# Patient Record
Sex: FEMALE | Race: BLACK OR AFRICAN AMERICAN | NOT HISPANIC OR LATINO | Employment: STUDENT | URBAN - METROPOLITAN AREA
[De-identification: names, ages, dates, MRNs, and addresses within clinical notes are randomized per-mention and may not be internally consistent; named-entity substitution may affect disease eponyms.]

---
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
## 2023-02-15 ENCOUNTER — OFFICE VISIT (OUTPATIENT)
Dept: PRIMARY CARE CLINIC | Facility: CLINIC | Age: 18
End: 2023-02-15
Payer: MEDICAID

## 2023-02-15 VITALS
HEIGHT: 64 IN | OXYGEN SATURATION: 100 % | HEART RATE: 77 BPM | BODY MASS INDEX: 21.27 KG/M2 | SYSTOLIC BLOOD PRESSURE: 104 MMHG | WEIGHT: 124.56 LBS | DIASTOLIC BLOOD PRESSURE: 57 MMHG

## 2023-02-15 DIAGNOSIS — R10.9 FLANK PAIN: Primary | ICD-10-CM

## 2023-02-15 DIAGNOSIS — K21.9 GASTROESOPHAGEAL REFLUX DISEASE, UNSPECIFIED WHETHER ESOPHAGITIS PRESENT: ICD-10-CM

## 2023-02-15 PROCEDURE — 1159F MED LIST DOCD IN RCRD: CPT | Mod: CPTII,,, | Performed by: STUDENT IN AN ORGANIZED HEALTH CARE EDUCATION/TRAINING PROGRAM

## 2023-02-15 PROCEDURE — 3008F PR BODY MASS INDEX (BMI) DOCUMENTED: ICD-10-PCS | Mod: CPTII,,, | Performed by: STUDENT IN AN ORGANIZED HEALTH CARE EDUCATION/TRAINING PROGRAM

## 2023-02-15 PROCEDURE — 99213 OFFICE O/P EST LOW 20 MIN: CPT | Mod: PBBFAC,PN | Performed by: STUDENT IN AN ORGANIZED HEALTH CARE EDUCATION/TRAINING PROGRAM

## 2023-02-15 PROCEDURE — 99999 PR PBB SHADOW E&M-EST. PATIENT-LVL III: CPT | Mod: PBBFAC,,, | Performed by: STUDENT IN AN ORGANIZED HEALTH CARE EDUCATION/TRAINING PROGRAM

## 2023-02-15 PROCEDURE — 1159F PR MEDICATION LIST DOCUMENTED IN MEDICAL RECORD: ICD-10-PCS | Mod: CPTII,,, | Performed by: STUDENT IN AN ORGANIZED HEALTH CARE EDUCATION/TRAINING PROGRAM

## 2023-02-15 PROCEDURE — 3074F PR MOST RECENT SYSTOLIC BLOOD PRESSURE < 130 MM HG: ICD-10-PCS | Mod: CPTII,,, | Performed by: STUDENT IN AN ORGANIZED HEALTH CARE EDUCATION/TRAINING PROGRAM

## 2023-02-15 PROCEDURE — 3008F BODY MASS INDEX DOCD: CPT | Mod: CPTII,,, | Performed by: STUDENT IN AN ORGANIZED HEALTH CARE EDUCATION/TRAINING PROGRAM

## 2023-02-15 PROCEDURE — 99213 PR OFFICE/OUTPT VISIT, EST, LEVL III, 20-29 MIN: ICD-10-PCS | Mod: S$PBB,,, | Performed by: STUDENT IN AN ORGANIZED HEALTH CARE EDUCATION/TRAINING PROGRAM

## 2023-02-15 PROCEDURE — 3078F DIAST BP <80 MM HG: CPT | Mod: CPTII,,, | Performed by: STUDENT IN AN ORGANIZED HEALTH CARE EDUCATION/TRAINING PROGRAM

## 2023-02-15 PROCEDURE — 99213 OFFICE O/P EST LOW 20 MIN: CPT | Mod: S$PBB,,, | Performed by: STUDENT IN AN ORGANIZED HEALTH CARE EDUCATION/TRAINING PROGRAM

## 2023-02-15 PROCEDURE — 99999 PR PBB SHADOW E&M-EST. PATIENT-LVL III: ICD-10-PCS | Mod: PBBFAC,,, | Performed by: STUDENT IN AN ORGANIZED HEALTH CARE EDUCATION/TRAINING PROGRAM

## 2023-02-15 PROCEDURE — 3078F PR MOST RECENT DIASTOLIC BLOOD PRESSURE < 80 MM HG: ICD-10-PCS | Mod: CPTII,,, | Performed by: STUDENT IN AN ORGANIZED HEALTH CARE EDUCATION/TRAINING PROGRAM

## 2023-02-15 PROCEDURE — 3074F SYST BP LT 130 MM HG: CPT | Mod: CPTII,,, | Performed by: STUDENT IN AN ORGANIZED HEALTH CARE EDUCATION/TRAINING PROGRAM

## 2023-02-15 NOTE — PROGRESS NOTES
02/15/2023    Patrica Spann  [id number]    Chief Complaint   Patient presents with    Abdominal Pain    Establish Care       HPI  This patient is new to me and presents to est car and below concerns.    Abd pain  Sharp pinches when walking on her sides. Does not happen at any other time, but notices more if she were to drink something. Does not feel if she is sitting down. Has been present for the last 3 days. Hx of recurrent UTIs but denies any current symptoms; notes that she has had UTI's w/o standard symptoms before.     GERD  Depends on what she eats but will have trouble digesting food and it will come back up  Was taking pantoprazole for one week but didn't notice a difference.    Diet: fried food and fast food and vanilla hulk   Sleep: no concerns  Exercise: none      Negative 10 point ROS outside of HPI    Social History     Socioeconomic History    Marital status: Single   Tobacco Use    Smoking status: Never    Smokeless tobacco: Never   Substance and Sexual Activity    Alcohol use: No    Drug use: No    Sexual activity: Never   Social History Narrative    Lives with mom, sister. in 11th grade. Older brother.        Current Outpatient Medications:     ciprofloxacin HCl (CIPRO) 500 MG tablet, SMARTSI Tablet(s) By Mouth Every 12 Hours, Disp: , Rfl:     ibuprofen (ADVIL,MOTRIN) 600 MG tablet, Take 1 tablet (600 mg total) by mouth 3 (three) times daily as needed for Pain. (Patient not taking: Reported on 10/14/2022), Disp: 10 tablet, Rfl: 0    ondansetron (ZOFRAN-ODT) 4 MG TbDL, Take 1 tablet (4 mg total) by mouth every 6 (six) hours as needed (nausea/vomitting). (Patient not taking: Reported on 10/14/2022), Disp: 15 tablet, Rfl: 0    pantoprazole (PROTONIX) 20 MG tablet, Take 1 tablet (20 mg total) by mouth once daily. (Patient not taking: Reported on 10/14/2022), Disp: 30 tablet, Rfl: 11      Physical Exam  Vitals:    02/15/23 1015   BP: (!) 104/57   Pulse: 77     Gen: well appearing, NAD  Resp: non  labored breathing, no crackles, no wheezes, CTAB  CV: RRR no murmur, gallops, rubs, no LE edema  Abd: soft nontender BS present no organomegaly  Pain non reproducible    1. Flank pain  - POCT URINALYSIS: negative for signs of infection    2.GERD  Recommended diet change and avoiding food triggers      RTC as needed    Raisa Roach MD  Family Medicine

## 2023-05-29 ENCOUNTER — PATIENT OUTREACH (OUTPATIENT)
Dept: ADMINISTRATIVE | Facility: HOSPITAL | Age: 18
End: 2023-05-29
Payer: MEDICAID

## 2023-05-29 ENCOUNTER — PATIENT MESSAGE (OUTPATIENT)
Dept: ADMINISTRATIVE | Facility: HOSPITAL | Age: 18
End: 2023-05-29
Payer: MEDICAID

## 2023-06-28 ENCOUNTER — OFFICE VISIT (OUTPATIENT)
Dept: OBSTETRICS AND GYNECOLOGY | Facility: CLINIC | Age: 18
End: 2023-06-28
Attending: OBSTETRICS & GYNECOLOGY
Payer: MEDICAID

## 2023-06-28 VITALS
HEIGHT: 64 IN | BODY MASS INDEX: 21.46 KG/M2 | WEIGHT: 125.69 LBS | DIASTOLIC BLOOD PRESSURE: 60 MMHG | SYSTOLIC BLOOD PRESSURE: 116 MMHG

## 2023-06-28 DIAGNOSIS — N90.7 VULVAR CYST: Primary | ICD-10-CM

## 2023-06-28 PROCEDURE — 1159F PR MEDICATION LIST DOCUMENTED IN MEDICAL RECORD: ICD-10-PCS | Mod: CPTII,,, | Performed by: OBSTETRICS & GYNECOLOGY

## 2023-06-28 PROCEDURE — 99213 OFFICE O/P EST LOW 20 MIN: CPT | Mod: S$PBB,,, | Performed by: OBSTETRICS & GYNECOLOGY

## 2023-06-28 PROCEDURE — 99213 OFFICE O/P EST LOW 20 MIN: CPT | Mod: PBBFAC | Performed by: OBSTETRICS & GYNECOLOGY

## 2023-06-28 PROCEDURE — 1159F MED LIST DOCD IN RCRD: CPT | Mod: CPTII,,, | Performed by: OBSTETRICS & GYNECOLOGY

## 2023-06-28 PROCEDURE — 1160F PR REVIEW ALL MEDS BY PRESCRIBER/CLIN PHARMACIST DOCUMENTED: ICD-10-PCS | Mod: CPTII,,, | Performed by: OBSTETRICS & GYNECOLOGY

## 2023-06-28 PROCEDURE — 3074F SYST BP LT 130 MM HG: CPT | Mod: CPTII,,, | Performed by: OBSTETRICS & GYNECOLOGY

## 2023-06-28 PROCEDURE — 3008F PR BODY MASS INDEX (BMI) DOCUMENTED: ICD-10-PCS | Mod: CPTII,,, | Performed by: OBSTETRICS & GYNECOLOGY

## 2023-06-28 PROCEDURE — 99999 PR PBB SHADOW E&M-EST. PATIENT-LVL III: CPT | Mod: PBBFAC,,, | Performed by: OBSTETRICS & GYNECOLOGY

## 2023-06-28 PROCEDURE — 99999 PR PBB SHADOW E&M-EST. PATIENT-LVL III: ICD-10-PCS | Mod: PBBFAC,,, | Performed by: OBSTETRICS & GYNECOLOGY

## 2023-06-28 PROCEDURE — 99213 PR OFFICE/OUTPT VISIT, EST, LEVL III, 20-29 MIN: ICD-10-PCS | Mod: S$PBB,,, | Performed by: OBSTETRICS & GYNECOLOGY

## 2023-06-28 PROCEDURE — 3008F BODY MASS INDEX DOCD: CPT | Mod: CPTII,,, | Performed by: OBSTETRICS & GYNECOLOGY

## 2023-06-28 PROCEDURE — 3078F DIAST BP <80 MM HG: CPT | Mod: CPTII,,, | Performed by: OBSTETRICS & GYNECOLOGY

## 2023-06-28 PROCEDURE — 1160F RVW MEDS BY RX/DR IN RCRD: CPT | Mod: CPTII,,, | Performed by: OBSTETRICS & GYNECOLOGY

## 2023-06-28 PROCEDURE — 3078F PR MOST RECENT DIASTOLIC BLOOD PRESSURE < 80 MM HG: ICD-10-PCS | Mod: CPTII,,, | Performed by: OBSTETRICS & GYNECOLOGY

## 2023-06-28 PROCEDURE — 3074F PR MOST RECENT SYSTOLIC BLOOD PRESSURE < 130 MM HG: ICD-10-PCS | Mod: CPTII,,, | Performed by: OBSTETRICS & GYNECOLOGY

## 2023-06-28 RX ORDER — SULFAMETHOXAZOLE AND TRIMETHOPRIM 400; 80 MG/1; MG/1
1 TABLET ORAL 2 TIMES DAILY
Qty: 20 TABLET | Refills: 0 | Status: SHIPPED | OUTPATIENT
Start: 2023-06-28 | End: 2023-07-08

## 2023-06-29 ENCOUNTER — TELEPHONE (OUTPATIENT)
Dept: UROGYNECOLOGY | Facility: CLINIC | Age: 18
End: 2023-06-29
Payer: MEDICAID

## 2023-06-29 NOTE — TELEPHONE ENCOUNTER
----- Message from Jud Martinez RN sent at 6/29/2023 12:03 PM CDT -----  This is a referral to uro/gyn to be seen ASAP.    Jud  ----- Message -----  From: Lia Cantu MA  Sent: 6/29/2023  11:55 AM CDT  To: Jud Martinez RN    Call me [phone]  ----- Message -----  From: Jud Martinez RN  Sent: 6/29/2023  11:52 AM CDT  To: Lia Cantu MA, Rosa Maria Hook RN    Hi Lia,  Does she need to be seen by gyn/oncology or uro/gyn. We are happy to see her in gyn/oncology, but Dr. Talbot's note and referral is to uro/gyn.    Jud  ----- Message -----  From: Rosa Maria Hook RN  Sent: 6/28/2023   5:29 PM CDT  To: Jud Martinez RN      ----- Message -----  From: Lia Cantu MA  Sent: 6/28/2023   2:57 PM CDT  To: [facility] G[facility] Onc Clinical Support    Dr Talbot is referring patient for vulvar abscess to be seem in 48 hrs.    Lia  OB/GYN

## 2023-06-29 NOTE — PROGRESS NOTES
SUBJECTIVE:   17 y.o. female   for left vulvar cyst. Patient's last menstrual period was 2023..  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   Records show 2017-- initial abscess- failed oral ABX. went to OR for I&D (office procedure not tolerated). Not in location of Bartholin's.   Recurred 2020 and treated with oral Bactrim.    Recurrence 12-15-21 ED did I&D with Word cathter placement - removed 22  Recurred 10-14-22. Had spontaneous rupture with resolution  Now reports swelling and pain for 5 days to left vulva. Has been applying some topical ABX from [place] with relief. No drainage.         Past Medical History:   Diagnosis Date    Gastroesophageal reflux disease without esophagitis     Headache     Recurrent UTI      History reviewed. No pertinent surgical history.  Social History     Socioeconomic History    Marital status: Single   Tobacco Use    Smoking status: Never    Smokeless tobacco: Never   Substance and Sexual Activity    Alcohol use: No    Drug use: No    Sexual activity: Never   Social History Narrative    Lives with mom, sister. in 11th grade. Older brother.      Family History   Problem Relation Age of Onset    Hypertension Paternal Grandfather     Cancer Paternal Grandfather     Hypertension Paternal Grandmother     Cancer Paternal Grandmother     Cancer Maternal Grandmother     Cancer Maternal Grandfather     Hypertension Maternal Grandfather     Hypertension Father     Miscarriages / Stillbirths Neg Hx     Diabetes Neg Hx      OB History    Para Term  AB Living   0 0 0 0 0 0   SAB IAB Ectopic Multiple Live Births   0 0 0 0           Current Outpatient Medications   Medication Sig Dispense Refill    ibuprofen (ADVIL,MOTRIN) 600 MG tablet Take 1 tablet (600 mg total) by mouth 3 (three) times daily as needed for Pain. (Patient not taking: Reported on 10/14/2022) 10 tablet 0    ondansetron (ZOFRAN-ODT) 4 MG TbDL Take 1 tablet (4 mg total) by mouth every 6  "(six) hours as needed (nausea/vomitting). (Patient not taking: Reported on 10/14/2022) 15 tablet 0    pantoprazole (PROTONIX) 20 MG tablet Take 1 tablet (20 mg total) by mouth once daily. (Patient not taking: Reported on 10/14/2022) 30 tablet 11    sulfamethoxazole-trimethoprim 400-80mg (BACTRIM) 400-80 mg per tablet Take 1 tablet by mouth 2 (two) times daily. for 10 days 20 tablet 0     No current facility-administered medications for this visit.     Allergies: Amoxicillin-pot clavulanate     ROS:  Constitutional: no weight loss, weight gain, fever, fatigue  Eyes:  No vision changes, glasses/contacts  ENT/Mouth: No ulcers, sinus problems, ears ringing, headache  Cardiovascular: No inability to lie flat, chest pain, exercise intolerance, swelling, heart palpitations  Respiratory: No wheezing, coughing blood, shortness of breath, or cough  Gastrointestinal: No diarrhea, bloody stool, nausea/vomiting, constipation, gas, hemorrhoids  Genitourinary: No blood in urine, painful urination, urgency of urination, frequency of urination, incomplete emptying, incontinence, abnormal bleeding, painful periods, heavy periods, vaginal discharge, vaginal odor, painful intercourse, sexual problems, bleeding after intercourse.  Musculoskeletal: No muscle weakness  Skin/Breast: No painful breasts, nipple discharge, masses, rash, ulcers  Neurological: No passing out, seizures, numbness, headache  Endocrine: No diabetes, hypothyroid, hyperthyroid, hot flashes, hair loss, abnormal hair growth, ance  Psychiatric: No depression, crying  Hematologic: No bruises, bleeding, swollen lymph nodes, anemia.      OBJECTIVE:   The patient appears well, alert, oriented x 3, in no distress.  /60   Ht 5' 4" (1.626 m)   Wt 57 kg (125 lb 10.6 oz)   LMP 06/22/2023   BMI 21.57 kg/m²     GENITALIA: normal external genitalia, no erythema, no discharge  Mid portion of left labia majora with 3 cm swelling. Does not appear to be in location of " Bartholn's gland  Some fluctuance and tenderness. Presumed cyst vs early abscess  No drainage.      Physical Exam  Genitourinary:              ASSESSMENT:   1. Vulvar cyst  Ambulatory referral/consult to Urogynecology          PLAN:   Orders Placed This Encounter    Ambulatory referral/consult to Urogynecology    sulfamethoxazole-trimethoprim 400-80mg (BACTRIM) 400-80 mg per tablet     Discussed labia cyst/ abscess. Trial of oral ABX  Will consider surgical options- marsupialization vs excision. And OR vs office with local.  Would favor OR marsupialization as opposed to office to best reduce recurrence risks.  Cyst excision has risks of bleeding, scarring, pain.  Return to clinic 2-3 days  Consider urogyn consult

## 2023-06-29 NOTE — TELEPHONE ENCOUNTER
----- Message from Lia Cantu MA sent at 6/29/2023 10:38 AM CDT -----  Dr Talbot is referring patient for Vulvar abscess - to be seem by Friday    Lia  #[id number]

## 2023-06-29 NOTE — TELEPHONE ENCOUNTER
----- Message from Merlene Bajwa MA sent at 6/29/2023 11:57 AM CDT -----  Please contact pt for appt scheduling.      Thank you  ----- Message -----  From: Lia Cantu MA  Sent: 6/29/2023  10:42 AM CDT  To: [facility] Urogyn Clinical Support Staff    Dr Talbot is referring patient for Vulvar abscess - to be seem by Friday    Lia  #[id number]

## 2023-06-30 ENCOUNTER — OFFICE VISIT (OUTPATIENT)
Dept: UROGYNECOLOGY | Facility: CLINIC | Age: 18
End: 2023-06-30
Attending: OBSTETRICS & GYNECOLOGY
Payer: MEDICAID

## 2023-06-30 VITALS
DIASTOLIC BLOOD PRESSURE: 68 MMHG | SYSTOLIC BLOOD PRESSURE: 118 MMHG | BODY MASS INDEX: 21.46 KG/M2 | TEMPERATURE: 76 F | WEIGHT: 125 LBS

## 2023-06-30 DIAGNOSIS — N90.7 VULVAR CYST: Primary | ICD-10-CM

## 2023-06-30 DIAGNOSIS — N90.7 VULVAR CYST: ICD-10-CM

## 2023-06-30 DIAGNOSIS — Z01.818 PREOP TESTING: ICD-10-CM

## 2023-06-30 PROBLEM — Z98.890 STATUS POST INCISION AND DRAINAGE: Status: RESOLVED | Noted: 2017-04-13 | Resolved: 2023-06-30

## 2023-06-30 PROBLEM — N76.4 VULVAR ABSCESS: Status: RESOLVED | Noted: 2020-09-01 | Resolved: 2023-06-30

## 2023-06-30 PROBLEM — R10.31 ABDOMINAL PAIN, ACUTE, RIGHT LOWER QUADRANT: Status: RESOLVED | Noted: 2022-06-22 | Resolved: 2023-06-30

## 2023-06-30 PROCEDURE — 1160F PR REVIEW ALL MEDS BY PRESCRIBER/CLIN PHARMACIST DOCUMENTED: ICD-10-PCS | Mod: CPTII,,, | Performed by: OBSTETRICS & GYNECOLOGY

## 2023-06-30 PROCEDURE — 3008F BODY MASS INDEX DOCD: CPT | Mod: CPTII,,, | Performed by: OBSTETRICS & GYNECOLOGY

## 2023-06-30 PROCEDURE — 99999 PR PBB SHADOW E&M-EST. PATIENT-LVL III: CPT | Mod: PBBFAC,,, | Performed by: OBSTETRICS & GYNECOLOGY

## 2023-06-30 PROCEDURE — 3078F DIAST BP <80 MM HG: CPT | Mod: CPTII,,, | Performed by: OBSTETRICS & GYNECOLOGY

## 2023-06-30 PROCEDURE — 1160F RVW MEDS BY RX/DR IN RCRD: CPT | Mod: CPTII,,, | Performed by: OBSTETRICS & GYNECOLOGY

## 2023-06-30 PROCEDURE — 3074F SYST BP LT 130 MM HG: CPT | Mod: CPTII,,, | Performed by: OBSTETRICS & GYNECOLOGY

## 2023-06-30 PROCEDURE — 99215 OFFICE O/P EST HI 40 MIN: CPT | Mod: S$PBB,,, | Performed by: OBSTETRICS & GYNECOLOGY

## 2023-06-30 PROCEDURE — 99999 PR PBB SHADOW E&M-EST. PATIENT-LVL III: ICD-10-PCS | Mod: PBBFAC,,, | Performed by: OBSTETRICS & GYNECOLOGY

## 2023-06-30 PROCEDURE — 3078F PR MOST RECENT DIASTOLIC BLOOD PRESSURE < 80 MM HG: ICD-10-PCS | Mod: CPTII,,, | Performed by: OBSTETRICS & GYNECOLOGY

## 2023-06-30 PROCEDURE — 1159F MED LIST DOCD IN RCRD: CPT | Mod: CPTII,,, | Performed by: OBSTETRICS & GYNECOLOGY

## 2023-06-30 PROCEDURE — 99215 PR OFFICE/OUTPT VISIT, EST, LEVL V, 40-54 MIN: ICD-10-PCS | Mod: S$PBB,,, | Performed by: OBSTETRICS & GYNECOLOGY

## 2023-06-30 PROCEDURE — 99213 OFFICE O/P EST LOW 20 MIN: CPT | Mod: PBBFAC | Performed by: OBSTETRICS & GYNECOLOGY

## 2023-06-30 PROCEDURE — 3008F PR BODY MASS INDEX (BMI) DOCUMENTED: ICD-10-PCS | Mod: CPTII,,, | Performed by: OBSTETRICS & GYNECOLOGY

## 2023-06-30 PROCEDURE — 3074F PR MOST RECENT SYSTOLIC BLOOD PRESSURE < 130 MM HG: ICD-10-PCS | Mod: CPTII,,, | Performed by: OBSTETRICS & GYNECOLOGY

## 2023-06-30 PROCEDURE — 1159F PR MEDICATION LIST DOCUMENTED IN MEDICAL RECORD: ICD-10-PCS | Mod: CPTII,,, | Performed by: OBSTETRICS & GYNECOLOGY

## 2023-06-30 NOTE — PROGRESS NOTES
"Chief Complaint   Patient presents with    vaginal cyst        HPI: Patient is a 17 y.o. female  who presents today with a recurrent vulvar cyst/abscess for several years. Review of records indicates that she had an EUA and I&D of a Bartholin's cyst on 17. Patient was seen subsequently for a similar symptoms 20 and was treated with Bactrim. She was also seen 12/15/21 for I&D and word catheter placement in the ED. Seen 10/14/22 following recurrence and spontaneous rupture with resolution. At that visit Dr. Huerta discussed marsupialization. Patient seen on 23 by Dr. Talbot due to swelling and pain for 5 days of the left vulva. She was using some topical antibiotics without resolution. Patient was prescribed Bactrim but has not started the medication.     Patient reports that last since last Saturday she experience pain and swelling. She typically states that "it gets really big". She has been using a topical antibiotic  so it has not gotten as large. She believes that it commonly occurs after waxing.      Review of Systems   Constitutional:  Negative for activity change, appetite change, chills, fatigue, fever and unexpected weight change.   HENT:  Negative for nasal congestion, dental problem, hearing loss, mouth dryness and sore throat.    Eyes:  Negative for pain and eye dryness.   Respiratory:  Negative for cough, shortness of breath and wheezing.    Cardiovascular:  Negative for chest pain, palpitations and leg swelling.   Gastrointestinal:  Negative for abdominal distention, abdominal pain, blood in stool, constipation and rectal pain.   Endocrine: Negative for cold intolerance and heat intolerance.   Genitourinary:  Negative for dyspareunia, hot flashes and vaginal dryness.   Musculoskeletal:  Negative for arthralgias, back pain, gait problem, joint swelling, myalgias, neck pain and neck stiffness.   Integumentary:  Negative for rash.   Neurological:  Negative for dizziness, tremors, " seizures, speech difficulty, weakness, light-headedness, numbness and headaches.   Psychiatric/Behavioral:  Negative for confusion, dysphoric mood and sleep disturbance. The patient is not nervous/anxious.       Past Medical History:   Diagnosis Date    Gastroesophageal reflux disease without esophagitis     Headache     Recurrent UTI        Past Surgical History:   Procedure Laterality Date    NO PAST SURGERIES           Current Outpatient Medications:     ibuprofen (ADVIL,MOTRIN) 600 MG tablet, Take 1 tablet (600 mg total) by mouth 3 (three) times daily as needed for Pain. (Patient not taking: Reported on 10/14/2022), Disp: 10 tablet, Rfl: 0    ondansetron (ZOFRAN-ODT) 4 MG TbDL, Take 1 tablet (4 mg total) by mouth every 6 (six) hours as needed (nausea/vomitting). (Patient not taking: Reported on 10/14/2022), Disp: 15 tablet, Rfl: 0    pantoprazole (PROTONIX) 20 MG tablet, Take 1 tablet (20 mg total) by mouth once daily. (Patient not taking: Reported on 10/14/2022), Disp: 30 tablet, Rfl: 11    sulfamethoxazole-trimethoprim 400-80mg (BACTRIM) 400-80 mg per tablet, Take 1 tablet by mouth 2 (two) times daily. for 10 days (Patient not taking: Reported on 6/30/2023), Disp: 20 tablet, Rfl: 0    Review of patient's allergies indicates:   Allergen Reactions    Amoxicillin-pot clavulanate        Family History   Problem Relation Age of Onset    No Known Problems Mother     Hypertension Father     Cancer Maternal Grandmother     Cancer Maternal Grandfather     Hypertension Maternal Grandfather     Hypertension Paternal Grandmother     Cancer Paternal Grandmother     Hypertension Paternal Grandfather     Cancer Paternal Grandfather     Miscarriages / Stillbirths Neg Hx     Diabetes Neg Hx        Social History     Socioeconomic History    Marital status: Single   Occupational History    Occupation: student    Occupation:    Tobacco Use    Smoking status: Never    Smokeless tobacco: Never   Substance and Sexual  Activity    Alcohol use: No    Drug use: No    Sexual activity: Never   Social History Narrative    Lives with mom and sister. in 11th grade. Has 3 brothers and 3 sisters.         OB History          0    Para   0    Term   0       0    AB   0    Living   0         SAB   0    IAB   0    Ectopic   0    Multiple   0    Live Births                     Gyn History    Mammogram: n/a  Pap smear: n/a  LMP: Patient's last menstrual period was 2023.   Postmenopausal bleeding: n/a      INITIAL PHYSICAL EXAMINATION    Vitals:    23 1112   BP: 118/68   Temp: (!) 76 °F (24.4 °C)      General: Healthy in appearance, Well nourished, Affect Normal, NAD.  Neck: Supple, No masses, Trachea midline, Thyroid normal size,  non-tender, no masses or nodules.  Nodes: No clavicular/cervical adenopathy.  Skin: Normal temperature, No atypical lesions or rash.  Heart: Normal sounds, no murmurs  Lungs: Normal respiratory effort.  Gastrointestinal: Non tender, Non distended, No masses, guarding or  rebound, No hepatosplenomegally, No hernia.  Ext: No clubbing, cyanosis, edema or varicosities.  DTR's: 2+ bilaterally  Strength 5/5 bilateral upper and lower extremities    Genitourinary-  Vulva: normal without lesions, masses, atrophy or pain; left labia mass 4 x 2.5 cm, mobile, non-tender  Urethra: meatus central and normal in appearance, no prolapse/caruncle, no masses or discharge. Empty supine stress test was negative.  Bladder: deferred  Vagina: deferred  Cervix: deferred  Uterus:  deferred  Adnexa: no masses, non tender.  Rectal: deferred   Neuro: S2-4- pin prick and light touch intact, Anal wink present      ASSESSMENT & PLAN:    Vulvar cyst  -     Ambulatory referral/consult to Urogynecology       16 yo with a left labial cyst presented today for evaluation. Given that it has recurred numerous times sine 2017 advised excision of the cyst. Risks and benefits were reviewed including, pain, infection, bleeding,  hematoma, scar or labial disfigurement, recurrence. Advised patient to stop waxing since this could be a source. Recommended as we are about to start a long weekend that she start the antibiotics. She will return in the afternoon on 7/6 to ensure that it has not resolved and to sign a consent. She is scheduled for excision at 7 am on 7/723.       All questions were answered today. The patient was encouraged to contact the office as needed with any additional questions or concerns.     Total time spent on visit was 40 minutes.  This includes face to face time and non-face to face time preparing to see the patient (eg, review of tests), Obtaining and/or reviewing separately obtained history, Documenting clinical information in the electronic or other health record, Independently interpreting resultsand communicating results to the patient/family/caregiver, or Care coordination.    Sienna Tinsley MD

## 2023-07-06 ENCOUNTER — HOSPITAL ENCOUNTER (OUTPATIENT)
Dept: PREADMISSION TESTING | Facility: OTHER | Age: 18
Discharge: HOME | End: 2023-07-06
Payer: MEDICAID

## 2023-07-06 RX ORDER — ACETAMINOPHEN 500 MG
1000 TABLET ORAL
Status: CANCELLED | OUTPATIENT
Start: 2023-07-06 | End: 2023-07-06

## 2023-07-06 RX ORDER — PREGABALIN 75 MG/1
75 CAPSULE ORAL ONCE
Status: CANCELLED | OUTPATIENT
Start: 2023-07-06 | End: 2023-07-06

## 2023-07-06 RX ORDER — SODIUM CHLORIDE, SODIUM LACTATE, POTASSIUM CHLORIDE, CALCIUM CHLORIDE 600; 310; 30; 20 MG/100ML; MG/100ML; MG/100ML; MG/100ML
INJECTION, SOLUTION INTRAVENOUS CONTINUOUS
Status: CANCELLED | OUTPATIENT
Start: 2023-07-06

## 2023-07-06 RX ORDER — LIDOCAINE HYDROCHLORIDE 10 MG/ML
0.5 INJECTION, SOLUTION EPIDURAL; INFILTRATION; INTRACAUDAL; PERINEURAL ONCE
Status: CANCELLED | OUTPATIENT
Start: 2023-07-06 | End: 2023-07-06

## 2023-07-06 NOTE — PRE ADMISSION SCREENING
Patient states bump went away and is no longer visible at all on the skin; given MD office number since wants to come in earlier.

## 2023-07-07 ENCOUNTER — TELEPHONE (OUTPATIENT)
Dept: UROGYNECOLOGY | Facility: CLINIC | Age: 18
End: 2023-07-07
Payer: MEDICAID

## 2023-07-07 NOTE — TELEPHONE ENCOUNTER
----- Message from Ami Ariza sent at 7/6/2023  8:57 AM CDT -----  Regarding: cyst gone  Name of Who is Calling:MAYELA TA [[id number]]          What is the request in detail: Pt has a preop appt today a procedure for a cyst removal tomorrow. The pt says that the cyst is gone. She is asking if you want to see her before she cancels procedure. Please c/b to assist           Can the clinic reply by [facility]:          What Number to Call Back if not in [facility]:[phone]

## 2023-07-16 ENCOUNTER — PATIENT MESSAGE (OUTPATIENT)
Dept: UROGYNECOLOGY | Facility: CLINIC | Age: 18
End: 2023-07-16
Payer: MEDICAID

## 2023-07-17 PROCEDURE — 56420 I&D BARTHOLINS GLAND ABSCESS: CPT

## 2023-07-17 PROCEDURE — 99284 EMERGENCY DEPT VISIT MOD MDM: CPT | Mod: 25

## 2023-07-18 ENCOUNTER — TELEPHONE (OUTPATIENT)
Dept: UROGYNECOLOGY | Facility: CLINIC | Age: 18
End: 2023-07-18
Payer: MEDICAID

## 2023-07-18 ENCOUNTER — HOSPITAL ENCOUNTER (EMERGENCY)
Facility: OTHER | Age: 18
Discharge: HOME | End: 2023-07-18
Attending: EMERGENCY MEDICINE
Payer: MEDICAID

## 2023-07-18 VITALS
HEART RATE: 92 BPM | DIASTOLIC BLOOD PRESSURE: 59 MMHG | BODY MASS INDEX: 21.63 KG/M2 | WEIGHT: 126 LBS | OXYGEN SATURATION: 98 % | SYSTOLIC BLOOD PRESSURE: 108 MMHG | RESPIRATION RATE: 18 BRPM | TEMPERATURE: 98 F

## 2023-07-18 DIAGNOSIS — N75.1 ABSCESS OF LEFT BARTHOLIN'S GLAND: Primary | ICD-10-CM

## 2023-07-18 DIAGNOSIS — R10.31 ABDOMINAL PAIN, ACUTE, RIGHT LOWER QUADRANT: ICD-10-CM

## 2023-07-18 DIAGNOSIS — R10.2 VULVAR PAIN: Primary | ICD-10-CM

## 2023-07-18 LAB
B-HCG UR QL: NEGATIVE
CTP QC/QA: YES

## 2023-07-18 PROCEDURE — 63600175 PHARM REV CODE 636 W HCPCS: Performed by: EMERGENCY MEDICINE

## 2023-07-18 PROCEDURE — 96372 THER/PROPH/DIAG INJ SC/IM: CPT | Performed by: EMERGENCY MEDICINE

## 2023-07-18 PROCEDURE — 99284 EMERGENCY DEPT VISIT MOD MDM: CPT | Mod: 25

## 2023-07-18 PROCEDURE — 56420 I&D BARTHOLINS GLAND ABSCESS: CPT

## 2023-07-18 PROCEDURE — 81025 URINE PREGNANCY TEST: CPT | Performed by: EMERGENCY MEDICINE

## 2023-07-18 PROCEDURE — 25000003 PHARM REV CODE 250: Performed by: EMERGENCY MEDICINE

## 2023-07-18 RX ORDER — LIDOCAINE HYDROCHLORIDE AND EPINEPHRINE 20; 10 MG/ML; UG/ML
10 INJECTION, SOLUTION INFILTRATION; PERINEURAL
Status: COMPLETED | OUTPATIENT
Start: 2023-07-18 | End: 2023-07-18

## 2023-07-18 RX ORDER — IBUPROFEN 600 MG/1
600 TABLET ORAL 3 TIMES DAILY PRN
Qty: 30 TABLET | Refills: 0 | Status: SHIPPED | OUTPATIENT
Start: 2023-07-18 | End: 2023-07-28

## 2023-07-18 RX ORDER — HYDROCODONE BITARTRATE AND ACETAMINOPHEN 5; 325 MG/1; MG/1
1 TABLET ORAL
Status: COMPLETED | OUTPATIENT
Start: 2023-07-18 | End: 2023-07-18

## 2023-07-18 RX ORDER — KETOROLAC TROMETHAMINE 30 MG/ML
30 INJECTION, SOLUTION INTRAMUSCULAR; INTRAVENOUS
Status: COMPLETED | OUTPATIENT
Start: 2023-07-18 | End: 2023-07-18

## 2023-07-18 RX ADMIN — HYDROCODONE BITARTRATE AND ACETAMINOPHEN 1 TABLET: 5; 325 TABLET ORAL at 03:07

## 2023-07-18 RX ADMIN — LIDOCAINE HYDROCHLORIDE,EPINEPHRINE BITARTRATE 10 ML: 20; .01 INJECTION, SOLUTION INFILTRATION; PERINEURAL at 03:07

## 2023-07-18 RX ADMIN — KETOROLAC TROMETHAMINE 30 MG: 30 INJECTION, SOLUTION INTRAMUSCULAR; INTRAVENOUS at 03:07

## 2023-07-18 NOTE — ED PROVIDER NOTES
Encounter Date: 7/17/2023    SCRIBE #1 NOTE: I, Yeison Navarro, am scribing for, and in the presence of,  Noni Wooten MD. I have scribed the following portions of the note - Other sections scribed: HPI, ROS, PE.     History     Chief Complaint   Patient presents with    Cyst     Pt possibly has a cyst located near her vagina opening and has been hurting over the past week.     Time seen by provider: 4:55 AM    This is a 17 y.o. female who presents with complaint of a cyst on her vulva that appeared about 2 weeks ago. The cyst is not draining. She reports associated headaches and chills. She reports being on bactrum to treat this previously, but stopped taking it for a time and the cyst reappeared since then. This is not the first time she has had the cyst, and it always appears on there same spot. She has an appointment tomorrow at her PCP to take a look at the area. Patient denies any other complaints at this time.    The history is provided by the patient.   Review of patient's allergies indicates:   Allergen Reactions    Amoxicillin-pot clavulanate      Past Medical History:   Diagnosis Date    Gastroesophageal reflux disease without esophagitis     Headache     Recurrent UTI      Past Surgical History:   Procedure Laterality Date    NO PAST SURGERIES       Family History   Problem Relation Age of Onset    No Known Problems Mother     Hypertension Father     Cancer Maternal Grandmother     Cancer Maternal Grandfather     Hypertension Maternal Grandfather     Hypertension Paternal Grandmother     Cancer Paternal Grandmother     Hypertension Paternal Grandfather     Cancer Paternal Grandfather     Miscarriages / Stillbirths Neg Hx     Diabetes Neg Hx      Social History     Tobacco Use    Smoking status: Never    Smokeless tobacco: Never   Substance Use Topics    Alcohol use: No    Drug use: No     Review of Systems   Constitutional:  Positive for chills. Negative for unexpected weight change.    HENT:  Negative for nosebleeds.    Eyes:  Negative for pain.   Respiratory:  Negative for apnea.    Cardiovascular:  Negative for palpitations.   Gastrointestinal:  Negative for constipation.   Genitourinary:  Negative for enuresis.   Skin:  Positive for rash. Negative for pallor.   Neurological:  Positive for headaches.   Hematological:  Does not bruise/bleed easily.   Psychiatric/Behavioral:  Negative for sleep disturbance.    All other systems reviewed and are negative.    Physical Exam     Initial Vitals [07/17/23 2329]   BP Pulse Resp Temp SpO2   126/72 90 17 98.9 °F (37.2 °C) 99 %      MAP       --         Physical Exam    Constitutional: She appears well-developed and well-nourished. She does not have a sickly appearance. No distress.   HENT:   Head: Normocephalic and atraumatic.   Right Ear: External ear normal.   Left Ear: External ear normal.   Eyes: Conjunctivae, EOM and lids are normal. Right eye exhibits no discharge. Left eye exhibits no discharge. Right conjunctiva is not injected. Right conjunctiva has no hemorrhage. Left conjunctiva is not injected. Left conjunctiva has no hemorrhage. No scleral icterus.   Neck: Phonation normal. No stridor present. No tracheal deviation present.   Normal range of motion.  Cardiovascular:            Pulses:       Radial pulses are 2+ on the right side and 2+ on the left side.        Dorsalis pedis pulses are 2+ on the right side and 2+ on the left side.   Pulmonary/Chest: No respiratory distress.   Genitourinary:    Genitourinary Comments: 2.5-3cm area of fluctuance on the left vulva     Musculoskeletal:      Cervical back: Normal range of motion.     Neurological: She is alert and oriented to person, place, and time. She has normal strength. GCS eye subscore is 4. GCS verbal subscore is 5. GCS motor subscore is 6.   Skin: Skin is warm.   Psychiatric: She has a normal mood and affect. Her speech is normal and behavior is normal. Judgment and thought content  normal. Cognition and memory are normal.       ED Course   I & D - Incision and Drainage    Date/Time: 7/18/2023 4:53 AM  Location procedure was performed: [facility] EMERGENCY DEPARTMENT  Performed by: Noni Wooten MD  Authorized by: Noni Wooten MD     Anesthesia:  Local Anesthetic: lidocaine 2% without epinephrine  Drainage: purulent  Comments: 10mL 2% lidocaine without epinephrine used. 11 Blade used,. Copious purulent drainage        Labs Reviewed   POCT URINE PREGNANCY          Imaging Results    None          Medications   LIDOcaine 2%/EPINEPHrine 1:100,000 injection 10 mL (10 mLs Intradermal Given 7/18/23 0331)   HYDROcodone-acetaminophen 5-325 mg per tablet 1 tablet (1 tablet Oral Given 7/18/23 0331)   ketorolac injection 30 mg (30 mg Intramuscular Given 7/18/23 0331)     Medical Decision Making:   History:   Old Medical Records: I decided to obtain old medical records.  Clinical Tests:   Lab Tests: Ordered and Reviewed  Additional MDM:   Comments: 17-year-old female with history of recurrent Bartholin abscesses presents with pain and swelling consistent with her previous abscesses.  She is well-appearing afebrile hemodynamically stable with a large abscess noted on exam.  Area was I and D with copious amount of purulent drainage expressed.  Word catheter placed and patient instructed to continue taking Bactrim that she was previously prescribed and to follow-up with her gyn this morning as already scheduled for further evaluation..      Scribe Attestation:   Scribe #1: I performed the above scribed service and the documentation accurately describes the services I performed. I attest to the accuracy of the note.       Physician Attestation for Scribe: I, Noni Wooten , reviewed documentation as scribed in my presence, which is both accurate and complete.             Clinical Impression:   Final diagnoses:  [N75.1] Abscess of left Bartholin's gland (Primary)        ED Disposition Condition    Discharge Stable           ED Prescriptions    None       Follow-up Information       Follow up With Specialties Details Why Contact Info    Sienna Tinsley MD Obstetrics and Gynecology, UroGynecology  Go today As scheduled [address] [address]  [phone]               Noni Wooten MD  07/18/23 6394

## 2023-07-18 NOTE — TELEPHONE ENCOUNTER
Spoke with pt who states she went to the ED and the cyst was drained and catheter was placed. Pt has been instructed to contact the office this week if catheter falls out so that the doctor can see the area, if catheter does not fall out patient has been scheduled to come in on Monday for removal, pt voiced understanding and call was ended.

## 2023-07-18 NOTE — TELEPHONE ENCOUNTER
----- Message from Primitivo Stern sent at 7/18/2023 12:38 PM CDT -----  Name of Who is Calling:MAYELA TA [[id number]]           What is the request in detail:pt stated that she would like to speak with the office directly in regards to her questions/concerns about her leaving the ER and wanted to know about the appt .Please contact to further discuss and advise.            Can the clinic reply by [facility]:[phone]           What Number to Call Back if not in [facility]:[phone]

## 2023-07-24 ENCOUNTER — OFFICE VISIT (OUTPATIENT)
Dept: UROGYNECOLOGY | Facility: CLINIC | Age: 18
End: 2023-07-24
Payer: MEDICAID

## 2023-07-24 VITALS
SYSTOLIC BLOOD PRESSURE: 113 MMHG | BODY MASS INDEX: 21.72 KG/M2 | WEIGHT: 126.56 LBS | DIASTOLIC BLOOD PRESSURE: 67 MMHG

## 2023-07-24 DIAGNOSIS — Z30.011 ENCOUNTER FOR INITIAL PRESCRIPTION OF CONTRACEPTIVE PILLS: ICD-10-CM

## 2023-07-24 DIAGNOSIS — N90.7 VULVAR CYST: Primary | ICD-10-CM

## 2023-07-24 PROCEDURE — 1159F MED LIST DOCD IN RCRD: CPT | Mod: CPTII,,, | Performed by: NURSE PRACTITIONER

## 2023-07-24 PROCEDURE — 99213 PR OFFICE/OUTPT VISIT, EST, LEVL III, 20-29 MIN: ICD-10-PCS | Mod: S$PBB,,, | Performed by: NURSE PRACTITIONER

## 2023-07-24 PROCEDURE — 3078F DIAST BP <80 MM HG: CPT | Mod: CPTII,,, | Performed by: NURSE PRACTITIONER

## 2023-07-24 PROCEDURE — 3008F BODY MASS INDEX DOCD: CPT | Mod: CPTII,,, | Performed by: NURSE PRACTITIONER

## 2023-07-24 PROCEDURE — 3078F PR MOST RECENT DIASTOLIC BLOOD PRESSURE < 80 MM HG: ICD-10-PCS | Mod: CPTII,,, | Performed by: NURSE PRACTITIONER

## 2023-07-24 PROCEDURE — 99213 OFFICE O/P EST LOW 20 MIN: CPT | Mod: S$PBB,,, | Performed by: NURSE PRACTITIONER

## 2023-07-24 PROCEDURE — 3074F SYST BP LT 130 MM HG: CPT | Mod: CPTII,,, | Performed by: NURSE PRACTITIONER

## 2023-07-24 PROCEDURE — 99999 PR PBB SHADOW E&M-EST. PATIENT-LVL III: ICD-10-PCS | Mod: PBBFAC,,, | Performed by: NURSE PRACTITIONER

## 2023-07-24 PROCEDURE — 1160F PR REVIEW ALL MEDS BY PRESCRIBER/CLIN PHARMACIST DOCUMENTED: ICD-10-PCS | Mod: CPTII,,, | Performed by: NURSE PRACTITIONER

## 2023-07-24 PROCEDURE — 3074F PR MOST RECENT SYSTOLIC BLOOD PRESSURE < 130 MM HG: ICD-10-PCS | Mod: CPTII,,, | Performed by: NURSE PRACTITIONER

## 2023-07-24 PROCEDURE — 99999 PR PBB SHADOW E&M-EST. PATIENT-LVL III: CPT | Mod: PBBFAC,,, | Performed by: NURSE PRACTITIONER

## 2023-07-24 PROCEDURE — 3008F PR BODY MASS INDEX (BMI) DOCUMENTED: ICD-10-PCS | Mod: CPTII,,, | Performed by: NURSE PRACTITIONER

## 2023-07-24 PROCEDURE — 1160F RVW MEDS BY RX/DR IN RCRD: CPT | Mod: CPTII,,, | Performed by: NURSE PRACTITIONER

## 2023-07-24 PROCEDURE — 1159F PR MEDICATION LIST DOCUMENTED IN MEDICAL RECORD: ICD-10-PCS | Mod: CPTII,,, | Performed by: NURSE PRACTITIONER

## 2023-07-24 PROCEDURE — 99213 OFFICE O/P EST LOW 20 MIN: CPT | Mod: PBBFAC | Performed by: NURSE PRACTITIONER

## 2023-07-24 RX ORDER — NORETHINDRONE ACETATE AND ETHINYL ESTRADIOL .02; 1 MG/1; MG/1
1 TABLET ORAL DAILY
Qty: 28 TABLET | Refills: 11 | Status: SHIPPED | OUTPATIENT
Start: 2023-07-24 | End: 2024-07-23

## 2023-07-24 NOTE — PATIENT INSTRUCTIONS
Vulvar cyst-- word catheter fell out 2 days later.     2. Desires contraception  --start loestrin Sunday after your period starts  --you need a backup method for the first week of the pills    3. Call / email when you feel like the cyst is filling up again and we will get you in

## 2023-07-24 NOTE — PROGRESS NOTES
"  Urogyn follow up  2023  .  [facility] - UROGYNECOLOGY  [address] [address]    Patrica Spann  [id number]  2005      Patrica Spann is a 17 y.o. here for a urogyn follow up for L vulvar cyst.    Last HPI from 2023  : Patient is a 17 y.o. female  who presents today with a recurrent vulvar cyst/abscess for several years. Review of records indicates that she had an EUA and I&D of a Bartholin's cyst on 17. Patient was seen subsequently for a similar symptoms 20 and was treated with Bactrim. She was also seen 12/15/21 for I&D and word catheter placement in the ED. Seen 10/14/22 following recurrence and spontaneous rupture with resolution. At that visit Dr. Huerta discussed marsupialization. Patient seen on 23 by Dr. Talbot due to swelling and pain for 5 days of the left vulva. She was using some topical antibiotics without resolution. Patient was prescribed Bactrim but has not started the medication.      Patient reports that last since last Saturday she experience pain and swelling. She typically states that "it gets really big". She has been using a topical antibiotic  so it has not gotten as large. She believes that it commonly occurs after waxing.       Changes from last visit:  Went to er on 2023 for cyst exacerbation-- lanced in ER and word catheter place. Patient states word catheter came out 2 days later. Pain has resolve-- no long having any discharge.    Past Medical History:   Diagnosis Date    Gastroesophageal reflux disease without esophagitis     Headache     Recurrent UTI        Past Surgical History:   Procedure Laterality Date    NO PAST SURGERIES         Family History   Problem Relation Age of Onset    No Known Problems Mother     Hypertension Father     Cancer Maternal Grandmother     Cancer Maternal Grandfather     Hypertension Maternal Grandfather     Hypertension Paternal Grandmother     Cancer Paternal Grandmother  "    Hypertension Paternal Grandfather     Cancer Paternal Grandfather     Miscarriages / Stillbirths Neg Hx     Diabetes Neg Hx        Social History     Socioeconomic History    Marital status: Single   Occupational History    Occupation: student    Occupation:    Tobacco Use    Smoking status: Never    Smokeless tobacco: Never   Substance and Sexual Activity    Alcohol use: No    Drug use: No    Sexual activity: Never   Social History Narrative    Lives with mom and sister. in 11th grade. Has 3 brothers and 3 sisters.         Current Outpatient Medications   Medication Sig Dispense Refill    ibuprofen (ADVIL,MOTRIN) 600 MG tablet Take 1 tablet (600 mg total) by mouth 3 (three) times daily as needed for Pain. (Patient not taking: Reported on 7/24/2023) 30 tablet 0    norethindrone-ethinyl estradiol (MICROGESTIN 1/20) 1-20 mg-mcg per tablet Take 1 tablet by mouth once daily. 28 tablet 11    pantoprazole (PROTONIX) 20 MG tablet Take 1 tablet (20 mg total) by mouth once daily. (Patient not taking: Reported on 10/14/2022) 30 tablet 11     No current facility-administered medications for this visit.       Review of patient's allergies indicates:   Allergen Reactions    Amoxicillin-pot clavulanate        Well woman:  Pap:n/a      ROS:  As per HPI.      Exam  /67 (BP Location: Right arm, Patient Position: Sitting, BP Method: Medium (Automatic))   Wt 57.4 kg (126 lb 8.7 oz)   LMP 07/12/2023   BMI 21.72 kg/m²   General: alert and oriented, no acute distress  Respiratory: normal respiratory effort  Abd: soft, non-tender, non-distended    Pelvic  Ext. Genitalia: L vulva slightly swollen.  Cyst 2 cm x 1 cm slightly palpable in mid L labia-- not well defined. No tenderness with palpation. Small opening from lancing healing well.  Normal bartholin's and skeens glands  Rest of pelvic deferred    1. Vulvar cyst        2. Encounter for initial prescription of contraceptive pills  norethindrone-ethinyl estradiol  (MICROGESTIN 1/20) 1-20 mg-mcg per tablet        We reviewed the above issues and discussed options for short-term versus long-term management of her problems.   Plan:   Vulvar cyst-- word catheter fell out 2 days later.     2. Desires contraception  --start loestrin Sunday after your period starts  --you need a backup method for the first week of the pills    3. Call / email when you feel like the cyst is filling up again and we will get you in    I spent a total of 20 minutes on the day of the visit.  This includes face to face time and non-face to face time preparing to see the patient (eg, review of tests), obtaining and/or reviewing separately obtained history, documenting clinical information in the electronic or other health record, independently interpreting results and communicating results to the patient/family/caregiver, or care coordinator.     Johana Bergeron, BERTHA-BC [facility]  Division of Female Pelvic Medicine and Reconstructive Surgery  Department of Obstetrics & Gynecology

## 2023-08-01 DIAGNOSIS — R10.2 VULVAR PAIN: ICD-10-CM

## 2023-08-01 DIAGNOSIS — Z01.818 PREOP TESTING: ICD-10-CM

## 2023-08-01 DIAGNOSIS — N90.7 VULVAR CYST: Primary | ICD-10-CM

## 2023-08-01 NOTE — TELEPHONE ENCOUNTER
Offered patient appt for today at 1:40 or 2:40- patient declined and agreed to appt tomorrow for 8:40.

## 2023-08-02 ENCOUNTER — PATIENT MESSAGE (OUTPATIENT)
Dept: UROLOGY | Facility: CLINIC | Age: 18
End: 2023-08-02
Payer: MEDICAID

## 2023-08-02 ENCOUNTER — OFFICE VISIT (OUTPATIENT)
Dept: UROGYNECOLOGY | Facility: CLINIC | Age: 18
End: 2023-08-02
Payer: MEDICAID

## 2023-08-02 ENCOUNTER — ANESTHESIA EVENT (OUTPATIENT)
Dept: SURGERY | Facility: OTHER | Age: 18
End: 2023-08-02
Payer: MEDICAID

## 2023-08-02 VITALS
WEIGHT: 127 LBS | SYSTOLIC BLOOD PRESSURE: 99 MMHG | DIASTOLIC BLOOD PRESSURE: 66 MMHG | HEART RATE: 80 BPM | BODY MASS INDEX: 21.8 KG/M2

## 2023-08-02 DIAGNOSIS — N90.7 VULVAR CYST: Primary | ICD-10-CM

## 2023-08-02 PROCEDURE — 1160F RVW MEDS BY RX/DR IN RCRD: CPT | Mod: CPTII,,, | Performed by: OBSTETRICS & GYNECOLOGY

## 2023-08-02 PROCEDURE — 99213 OFFICE O/P EST LOW 20 MIN: CPT | Mod: S$PBB,,, | Performed by: OBSTETRICS & GYNECOLOGY

## 2023-08-02 PROCEDURE — 3008F PR BODY MASS INDEX (BMI) DOCUMENTED: ICD-10-PCS | Mod: CPTII,,, | Performed by: OBSTETRICS & GYNECOLOGY

## 2023-08-02 PROCEDURE — 99213 OFFICE O/P EST LOW 20 MIN: CPT | Mod: PBBFAC | Performed by: OBSTETRICS & GYNECOLOGY

## 2023-08-02 PROCEDURE — 3008F BODY MASS INDEX DOCD: CPT | Mod: CPTII,,, | Performed by: OBSTETRICS & GYNECOLOGY

## 2023-08-02 PROCEDURE — 3074F PR MOST RECENT SYSTOLIC BLOOD PRESSURE < 130 MM HG: ICD-10-PCS | Mod: CPTII,,, | Performed by: OBSTETRICS & GYNECOLOGY

## 2023-08-02 PROCEDURE — 1159F MED LIST DOCD IN RCRD: CPT | Mod: CPTII,,, | Performed by: OBSTETRICS & GYNECOLOGY

## 2023-08-02 PROCEDURE — 3078F DIAST BP <80 MM HG: CPT | Mod: CPTII,,, | Performed by: OBSTETRICS & GYNECOLOGY

## 2023-08-02 PROCEDURE — 1160F PR REVIEW ALL MEDS BY PRESCRIBER/CLIN PHARMACIST DOCUMENTED: ICD-10-PCS | Mod: CPTII,,, | Performed by: OBSTETRICS & GYNECOLOGY

## 2023-08-02 PROCEDURE — 99213 PR OFFICE/OUTPT VISIT, EST, LEVL III, 20-29 MIN: ICD-10-PCS | Mod: S$PBB,,, | Performed by: OBSTETRICS & GYNECOLOGY

## 2023-08-02 PROCEDURE — 99999 PR PBB SHADOW E&M-EST. PATIENT-LVL III: CPT | Mod: PBBFAC,,, | Performed by: OBSTETRICS & GYNECOLOGY

## 2023-08-02 PROCEDURE — 1159F PR MEDICATION LIST DOCUMENTED IN MEDICAL RECORD: ICD-10-PCS | Mod: CPTII,,, | Performed by: OBSTETRICS & GYNECOLOGY

## 2023-08-02 PROCEDURE — 3078F PR MOST RECENT DIASTOLIC BLOOD PRESSURE < 80 MM HG: ICD-10-PCS | Mod: CPTII,,, | Performed by: OBSTETRICS & GYNECOLOGY

## 2023-08-02 PROCEDURE — 99999 PR PBB SHADOW E&M-EST. PATIENT-LVL III: ICD-10-PCS | Mod: PBBFAC,,, | Performed by: OBSTETRICS & GYNECOLOGY

## 2023-08-02 PROCEDURE — 3074F SYST BP LT 130 MM HG: CPT | Mod: CPTII,,, | Performed by: OBSTETRICS & GYNECOLOGY

## 2023-08-02 NOTE — PROGRESS NOTES
Chief Complaint   Patient presents with    Follow-up        HPI: Patient is a 17 y.o. female  who presents today with a recurrent vulvar cyst/abscess. This has been an ongoing issue for several years. Patient states that the swelling is slowly starting to come back and based on her past experiences she reports that in a few days it will be larger. Not noticing much pain at this point. She states that she can feel a lump but it is not as big. Currently taking penicillin.    Review of records indicates that she had an EUA and I&D of a Bartholin's cyst on 17. Patient was seen subsequently for a similar symptoms 20 and was treated with Bactrim. She was also seen 12/15/21 for I&D and word catheter placement in the ED. Seen 10/14/22 following recurrence and spontaneous rupture with resolution. At that visit Dr. Huerta discussed marsupialization. Patient seen on 23 by Dr. Talbot due to swelling and pain for 5 days of the left vulva. She was using some topical antibiotics without resolution.     She believes that it commonly occurs after waxing.    Patient's most recent episode was on 23 and she presented to the ED where it was incised and drained.     Review of Systems  Per HPI. All other reviewed systems are negative     Past Medical History:   Diagnosis Date    Gastroesophageal reflux disease without esophagitis     Headache     Recurrent UTI        Past Surgical History:   Procedure Laterality Date    NO PAST SURGERIES         Current Outpatient Medications:     norethindrone-ethinyl estradiol (MICROGESTIN ) 1-20 mg-mcg per tablet, Take 1 tablet by mouth once daily., Disp: 28 tablet, Rfl: 11    pantoprazole (PROTONIX) 20 MG tablet, Take 1 tablet (20 mg total) by mouth once daily. (Patient not taking: Reported on 10/14/2022), Disp: 30 tablet, Rfl: 11    Review of patient's allergies indicates:   Allergen Reactions    Amoxicillin-pot clavulanate        Family History   Problem Relation  Age of Onset    No Known Problems Mother     Hypertension Father     Cancer Maternal Grandmother     Cancer Maternal Grandfather     Hypertension Maternal Grandfather     Hypertension Paternal Grandmother     Cancer Paternal Grandmother     Hypertension Paternal Grandfather     Cancer Paternal Grandfather     Miscarriages / Stillbirths Neg Hx     Diabetes Neg Hx        Social History     Socioeconomic History    Marital status: Single   Occupational History    Occupation: student    Occupation:    Tobacco Use    Smoking status: Never    Smokeless tobacco: Never   Substance and Sexual Activity    Alcohol use: No    Drug use: No    Sexual activity: Never   Social History Narrative    Lives with mom and sister. in 11th grade. Has 3 brothers and 3 sisters.         OB History          0    Para   0    Term   0       0    AB   0    Living   0         SAB   0    IAB   0    Ectopic   0    Multiple   0    Live Births                     Gyn History    Mammogram: n/a  Pap smear: n/a  LMP: Patient's last menstrual period was 2023.   Postmenopausal bleeding: n/a      INITIAL PHYSICAL EXAMINATION    Vitals:    23 0908   BP: 99/66   Pulse: 80      General: Healthy in appearance, Well nourished, Affect Normal, NAD.    Genitourinary-  Vulva: normal without lesions, masses, atrophy or pain; left labia mass 2.5 x 21.5 cm, mobile, mildly tender  Urethra: meatus central and normal in appearance, no prolapse/caruncle, no masses or discharge. Empty supine stress test was negative.  Bladder: deferred  Vagina: deferred  Cervix: deferred  Uterus:  deferred  Adnexa: no masses, non tender.  Rectal: deferred     ASSESSMENT & PLAN:    There are no diagnoses linked to this encounter.     18 yo with a left labial cyst/ abscess presented today for re-evaluation. Given that it has recurred numerous times sine 2017 advised excision of the cyst. Risks and benefits were reviewed including, pain, infection,  bleeding, hematoma, scar or labial disfigurement, recurrence. Patient scheduled for surgery for 8/3/23.    All questions were answered today. The patient was encouraged to contact the office as needed with any additional questions or concerns.     Total time spent on visit was 20 minutes.  This includes face to face time and non-face to face time preparing to see the patient (eg, review of tests), Obtaining and/or reviewing separately obtained history, Documenting clinical information in the electronic or other health record, Independently interpreting resultsand communicating results to the patient/family/caregiver, or Care coordination.    Sienna Tinsley MD

## 2023-08-02 NOTE — PRE-PROCEDURE INSTRUCTIONS
Pre admit phone call completed.    Instructions given to patient about NPO status as follows:     The evening before surgery do not eat anything after 9 p.m. ( this includes hard candy, chewing gum and mints).  You may only have GATORADE, POWERADE AND WATER from 9 p.m. until you leave your home. DO NOT  DRINK ANY LIQUIDS ON THE WAY TO THE HOSPITAL.      Patient was also instructed on the below information:    Park in the Parking lot behind the hospital or in the [facility] across the street from the parking lot.  Parking is complimentary.  If you will be discharged the same day as your procedure, please arrange for a responsible adult to drive you home or  to accompany you if traveling by taxi.  YOU WILL NOT BE PERMITTED TO DRIVE OR TO LEAVE THE HOSPITAL ALONE AFTER SURGERY.  It is strongly recommended that you arrange for someone to remain with you for the first 24 hrs following your surgery.    Patient verbalized understanding of above instructions.

## 2023-08-03 ENCOUNTER — ANESTHESIA (OUTPATIENT)
Dept: SURGERY | Facility: OTHER | Age: 18
End: 2023-08-03
Payer: MEDICAID

## 2023-08-03 ENCOUNTER — HOSPITAL ENCOUNTER (OUTPATIENT)
Facility: OTHER | Age: 18
Discharge: HOME | End: 2023-08-03
Attending: OBSTETRICS & GYNECOLOGY | Admitting: OBSTETRICS & GYNECOLOGY
Payer: MEDICAID

## 2023-08-03 VITALS
SYSTOLIC BLOOD PRESSURE: 108 MMHG | DIASTOLIC BLOOD PRESSURE: 52 MMHG | HEART RATE: 98 BPM | BODY MASS INDEX: 21.68 KG/M2 | RESPIRATION RATE: 16 BRPM | WEIGHT: 127 LBS | TEMPERATURE: 99 F | HEIGHT: 64 IN | OXYGEN SATURATION: 100 %

## 2023-08-03 DIAGNOSIS — N90.7 VULVAR CYST: Primary | ICD-10-CM

## 2023-08-03 DIAGNOSIS — N76.4 VULVAR ABSCESS: ICD-10-CM

## 2023-08-03 DIAGNOSIS — Z01.818 PREOP TESTING: ICD-10-CM

## 2023-08-03 LAB
ABO + RH BLD: NORMAL
B-HCG UR QL: NEGATIVE
BASOPHILS # BLD AUTO: 0.05 K/UL (ref 0–0.2)
BASOPHILS NFR BLD: 0.9 % (ref 0–1.9)
BLD GP AB SCN CELLS X3 SERPL QL: NORMAL
CTP QC/QA: YES
DIFFERENTIAL METHOD: ABNORMAL
EOSINOPHIL # BLD AUTO: 0.1 K/UL (ref 0–0.5)
EOSINOPHIL NFR BLD: 1.6 % (ref 0–8)
ERYTHROCYTE [DISTWIDTH] IN BLOOD BY AUTOMATED COUNT: 13.1 % (ref 11.5–14.5)
GRAM STN SPEC: NORMAL
GRAM STN SPEC: NORMAL
HCT VFR BLD AUTO: 36.2 % (ref 37–48.5)
HGB BLD-MCNC: 11.8 G/DL (ref 12–16)
IMM GRANULOCYTES # BLD AUTO: 0.1 K/UL (ref 0–0.04)
IMM GRANULOCYTES NFR BLD AUTO: 1.8 % (ref 0–0.5)
LYMPHOCYTES # BLD AUTO: 2.1 K/UL (ref 1–4.8)
LYMPHOCYTES NFR BLD: 37.7 % (ref 18–48)
MCH RBC QN AUTO: 28.7 PG (ref 27–31)
MCHC RBC AUTO-ENTMCNC: 32.6 G/DL (ref 32–36)
MCV RBC AUTO: 88 FL (ref 82–98)
MONOCYTES # BLD AUTO: 0.6 K/UL (ref 0.3–1)
MONOCYTES NFR BLD: 10.3 % (ref 4–15)
NEUTROPHILS # BLD AUTO: 2.7 K/UL (ref 1.8–7.7)
NEUTROPHILS NFR BLD: 47.7 % (ref 38–73)
NRBC BLD-RTO: 0 /100 WBC
PLATELET # BLD AUTO: 313 K/UL (ref 150–450)
PMV BLD AUTO: 8.8 FL (ref 9.2–12.9)
RBC # BLD AUTO: 4.11 M/UL (ref 4–5.4)
SPECIMEN OUTDATE: NORMAL
WBC # BLD AUTO: 5.55 K/UL (ref 3.9–12.7)

## 2023-08-03 PROCEDURE — 25000003 PHARM REV CODE 250: Performed by: STUDENT IN AN ORGANIZED HEALTH CARE EDUCATION/TRAINING PROGRAM

## 2023-08-03 PROCEDURE — 71000033 HC RECOVERY, INTIAL HOUR: Performed by: OBSTETRICS & GYNECOLOGY

## 2023-08-03 PROCEDURE — 87116 MYCOBACTERIA CULTURE: CPT | Performed by: OBSTETRICS & GYNECOLOGY

## 2023-08-03 PROCEDURE — 87205 SMEAR GRAM STAIN: CPT | Performed by: OBSTETRICS & GYNECOLOGY

## 2023-08-03 PROCEDURE — 36000706: Performed by: OBSTETRICS & GYNECOLOGY

## 2023-08-03 PROCEDURE — 63600175 PHARM REV CODE 636 W HCPCS: Performed by: ANESTHESIOLOGY

## 2023-08-03 PROCEDURE — 87075 CULTR BACTERIA EXCEPT BLOOD: CPT | Performed by: OBSTETRICS & GYNECOLOGY

## 2023-08-03 PROCEDURE — 11426 EXC H-F-NK-SP B9+MARG >4 CM: CPT | Mod: ,,, | Performed by: OBSTETRICS & GYNECOLOGY

## 2023-08-03 PROCEDURE — 36000707: Performed by: OBSTETRICS & GYNECOLOGY

## 2023-08-03 PROCEDURE — 36415 COLL VENOUS BLD VENIPUNCTURE: CPT | Performed by: OBSTETRICS & GYNECOLOGY

## 2023-08-03 PROCEDURE — 12042 PR LAYR CLOS WND REST BODY 2.6-7.5 CM: ICD-10-PCS | Mod: 51,,, | Performed by: OBSTETRICS & GYNECOLOGY

## 2023-08-03 PROCEDURE — D9220A PRA ANESTHESIA: Mod: ANES,,, | Performed by: ANESTHESIOLOGY

## 2023-08-03 PROCEDURE — 25000003 PHARM REV CODE 250: Performed by: OBSTETRICS & GYNECOLOGY

## 2023-08-03 PROCEDURE — 12042 INTMD RPR N-HF/GENIT2.6-7.5: CPT | Mod: 51,,, | Performed by: OBSTETRICS & GYNECOLOGY

## 2023-08-03 PROCEDURE — 63600175 PHARM REV CODE 636 W HCPCS: Performed by: STUDENT IN AN ORGANIZED HEALTH CARE EDUCATION/TRAINING PROGRAM

## 2023-08-03 PROCEDURE — 71000015 HC POSTOP RECOV 1ST HR: Performed by: OBSTETRICS & GYNECOLOGY

## 2023-08-03 PROCEDURE — D9220A PRA ANESTHESIA: Mod: CRNA,,, | Performed by: STUDENT IN AN ORGANIZED HEALTH CARE EDUCATION/TRAINING PROGRAM

## 2023-08-03 PROCEDURE — 27201423 OPTIME MED/SURG SUP & DEVICES STERILE SUPPLY: Performed by: OBSTETRICS & GYNECOLOGY

## 2023-08-03 PROCEDURE — 37000008 HC ANESTHESIA 1ST 15 MINUTES: Performed by: OBSTETRICS & GYNECOLOGY

## 2023-08-03 PROCEDURE — 85025 COMPLETE CBC W/AUTO DIFF WBC: CPT | Performed by: OBSTETRICS & GYNECOLOGY

## 2023-08-03 PROCEDURE — 71000016 HC POSTOP RECOV ADDL HR: Performed by: OBSTETRICS & GYNECOLOGY

## 2023-08-03 PROCEDURE — D9220A PRA ANESTHESIA: ICD-10-PCS | Mod: ANES,,, | Performed by: ANESTHESIOLOGY

## 2023-08-03 PROCEDURE — 88304 TISSUE EXAM BY PATHOLOGIST: CPT | Performed by: PATHOLOGY

## 2023-08-03 PROCEDURE — 37000009 HC ANESTHESIA EA ADD 15 MINS: Performed by: OBSTETRICS & GYNECOLOGY

## 2023-08-03 PROCEDURE — 86900 BLOOD TYPING SEROLOGIC ABO: CPT | Performed by: OBSTETRICS & GYNECOLOGY

## 2023-08-03 PROCEDURE — 87102 FUNGUS ISOLATION CULTURE: CPT | Performed by: OBSTETRICS & GYNECOLOGY

## 2023-08-03 PROCEDURE — 87076 CULTURE ANAEROBE IDENT EACH: CPT | Performed by: OBSTETRICS & GYNECOLOGY

## 2023-08-03 PROCEDURE — 88304 TISSUE EXAM BY PATHOLOGIST: CPT | Mod: 26,,, | Performed by: PATHOLOGY

## 2023-08-03 PROCEDURE — 87070 CULTURE OTHR SPECIMN AEROBIC: CPT | Performed by: OBSTETRICS & GYNECOLOGY

## 2023-08-03 PROCEDURE — 88304 PR  SURG PATH,LEVEL III: ICD-10-PCS | Mod: 26,,, | Performed by: PATHOLOGY

## 2023-08-03 PROCEDURE — 63600175 PHARM REV CODE 636 W HCPCS: Performed by: OBSTETRICS & GYNECOLOGY

## 2023-08-03 PROCEDURE — 81025 URINE PREGNANCY TEST: CPT | Performed by: ANESTHESIOLOGY

## 2023-08-03 PROCEDURE — D9220A PRA ANESTHESIA: ICD-10-PCS | Mod: CRNA,,, | Performed by: STUDENT IN AN ORGANIZED HEALTH CARE EDUCATION/TRAINING PROGRAM

## 2023-08-03 PROCEDURE — 71000039 HC RECOVERY, EACH ADD'L HOUR: Performed by: OBSTETRICS & GYNECOLOGY

## 2023-08-03 PROCEDURE — 11426 PR EXC SKIN BENIG >4 CM REMAINDR BODY: ICD-10-PCS | Mod: ,,, | Performed by: OBSTETRICS & GYNECOLOGY

## 2023-08-03 PROCEDURE — 25000003 PHARM REV CODE 250: Performed by: ANESTHESIOLOGY

## 2023-08-03 PROCEDURE — 87206 SMEAR FLUORESCENT/ACID STAI: CPT | Performed by: OBSTETRICS & GYNECOLOGY

## 2023-08-03 RX ORDER — SODIUM CHLORIDE 0.9 % (FLUSH) 0.9 %
3 SYRINGE (ML) INJECTION
Status: DISCONTINUED | OUTPATIENT
Start: 2023-08-03 | End: 2023-08-03 | Stop reason: HOSPADM

## 2023-08-03 RX ORDER — OXYCODONE HYDROCHLORIDE 5 MG/1
5 TABLET ORAL EVERY 4 HOURS PRN
Qty: 12 TABLET | Refills: 0 | Status: SHIPPED | OUTPATIENT
Start: 2023-08-03

## 2023-08-03 RX ORDER — PREGABALIN 75 MG/1
75 CAPSULE ORAL ONCE
Status: COMPLETED | OUTPATIENT
Start: 2023-08-03 | End: 2023-08-03

## 2023-08-03 RX ORDER — MIDAZOLAM HYDROCHLORIDE 1 MG/ML
INJECTION INTRAMUSCULAR; INTRAVENOUS
Status: DISCONTINUED | OUTPATIENT
Start: 2023-08-03 | End: 2023-08-03

## 2023-08-03 RX ORDER — DOCUSATE SODIUM 100 MG/1
100 CAPSULE, LIQUID FILLED ORAL 2 TIMES DAILY PRN
Qty: 30 CAPSULE | Refills: 0 | Status: SHIPPED | OUTPATIENT
Start: 2023-08-03

## 2023-08-03 RX ORDER — ONDANSETRON 4 MG/1
8 TABLET, ORALLY DISINTEGRATING ORAL 2 TIMES DAILY
Qty: 8 TABLET | Refills: 0 | Status: SHIPPED | OUTPATIENT
Start: 2023-08-03

## 2023-08-03 RX ORDER — OXYCODONE HYDROCHLORIDE 5 MG/1
5 TABLET ORAL
Status: DISCONTINUED | OUTPATIENT
Start: 2023-08-03 | End: 2023-08-03 | Stop reason: HOSPADM

## 2023-08-03 RX ORDER — MUPIROCIN 20 MG/G
OINTMENT TOPICAL
Status: DISPENSED | OUTPATIENT
Start: 2023-08-03

## 2023-08-03 RX ORDER — OXYCODONE HYDROCHLORIDE 5 MG/1
5 TABLET ORAL ONCE
Status: COMPLETED | OUTPATIENT
Start: 2023-08-03 | End: 2023-08-03

## 2023-08-03 RX ORDER — DEXAMETHASONE SODIUM PHOSPHATE 4 MG/ML
INJECTION, SOLUTION INTRA-ARTICULAR; INTRALESIONAL; INTRAMUSCULAR; INTRAVENOUS; SOFT TISSUE
Status: DISCONTINUED | OUTPATIENT
Start: 2023-08-03 | End: 2023-08-03

## 2023-08-03 RX ORDER — ONDANSETRON 2 MG/ML
4 INJECTION INTRAMUSCULAR; INTRAVENOUS DAILY PRN
Status: DISCONTINUED | OUTPATIENT
Start: 2023-08-03 | End: 2023-08-03 | Stop reason: HOSPADM

## 2023-08-03 RX ORDER — SODIUM CHLORIDE, SODIUM LACTATE, POTASSIUM CHLORIDE, CALCIUM CHLORIDE 600; 310; 30; 20 MG/100ML; MG/100ML; MG/100ML; MG/100ML
INJECTION, SOLUTION INTRAVENOUS CONTINUOUS
Status: ACTIVE | OUTPATIENT
Start: 2023-08-03

## 2023-08-03 RX ORDER — FAMOTIDINE 20 MG/1
20 TABLET, FILM COATED ORAL
Status: COMPLETED | OUTPATIENT
Start: 2023-08-03 | End: 2023-08-03

## 2023-08-03 RX ORDER — CLINDAMYCIN PHOSPHATE 900 MG/50ML
900 INJECTION, SOLUTION INTRAVENOUS
Status: COMPLETED | OUTPATIENT
Start: 2023-08-03 | End: 2023-08-03

## 2023-08-03 RX ORDER — ONDANSETRON 2 MG/ML
INJECTION INTRAMUSCULAR; INTRAVENOUS
Status: DISCONTINUED | OUTPATIENT
Start: 2023-08-03 | End: 2023-08-03

## 2023-08-03 RX ORDER — PROPOFOL 10 MG/ML
VIAL (ML) INTRAVENOUS
Status: DISCONTINUED | OUTPATIENT
Start: 2023-08-03 | End: 2023-08-03

## 2023-08-03 RX ORDER — ACETAMINOPHEN 500 MG
1000 TABLET ORAL
Status: COMPLETED | OUTPATIENT
Start: 2023-08-03 | End: 2023-08-03

## 2023-08-03 RX ORDER — PHENYLEPHRINE HYDROCHLORIDE 10 MG/ML
INJECTION INTRAVENOUS
Status: DISCONTINUED | OUTPATIENT
Start: 2023-08-03 | End: 2023-08-03

## 2023-08-03 RX ORDER — HYDROMORPHONE HYDROCHLORIDE 2 MG/ML
0.4 INJECTION, SOLUTION INTRAMUSCULAR; INTRAVENOUS; SUBCUTANEOUS EVERY 5 MIN PRN
Status: DISCONTINUED | OUTPATIENT
Start: 2023-08-03 | End: 2023-08-03 | Stop reason: HOSPADM

## 2023-08-03 RX ORDER — BUPIVACAINE HYDROCHLORIDE 2.5 MG/ML
INJECTION, SOLUTION EPIDURAL; INFILTRATION; INTRACAUDAL
Status: DISCONTINUED | OUTPATIENT
Start: 2023-08-03 | End: 2023-08-03 | Stop reason: HOSPADM

## 2023-08-03 RX ORDER — FENTANYL CITRATE 50 UG/ML
INJECTION, SOLUTION INTRAMUSCULAR; INTRAVENOUS
Status: DISCONTINUED | OUTPATIENT
Start: 2023-08-03 | End: 2023-08-03

## 2023-08-03 RX ORDER — LIDOCAINE HYDROCHLORIDE 20 MG/ML
INJECTION INTRAVENOUS
Status: DISCONTINUED | OUTPATIENT
Start: 2023-08-03 | End: 2023-08-03

## 2023-08-03 RX ORDER — IBUPROFEN 600 MG/1
600 TABLET ORAL EVERY 6 HOURS PRN
Qty: 30 TABLET | Refills: 1 | Status: SHIPPED | OUTPATIENT
Start: 2023-08-03

## 2023-08-03 RX ORDER — LIDOCAINE HYDROCHLORIDE 10 MG/ML
0.5 INJECTION, SOLUTION EPIDURAL; INFILTRATION; INTRACAUDAL; PERINEURAL ONCE
Status: ACTIVE | OUTPATIENT
Start: 2023-08-03

## 2023-08-03 RX ORDER — MEPERIDINE HYDROCHLORIDE 25 MG/ML
12.5 INJECTION INTRAMUSCULAR; INTRAVENOUS; SUBCUTANEOUS ONCE AS NEEDED
Status: DISCONTINUED | OUTPATIENT
Start: 2023-08-03 | End: 2023-08-03 | Stop reason: HOSPADM

## 2023-08-03 RX ADMIN — PHENYLEPHRINE HYDROCHLORIDE 50 MCG: 10 INJECTION INTRAVENOUS at 01:08

## 2023-08-03 RX ADMIN — DEXAMETHASONE SODIUM PHOSPHATE 4 MG: 4 INJECTION, SOLUTION INTRAMUSCULAR; INTRAVENOUS at 12:08

## 2023-08-03 RX ADMIN — FENTANYL CITRATE 100 MCG: 50 INJECTION, SOLUTION INTRAMUSCULAR; INTRAVENOUS at 12:08

## 2023-08-03 RX ADMIN — PREGABALIN 75 MG: 75 CAPSULE ORAL at 11:08

## 2023-08-03 RX ADMIN — CLINDAMYCIN PHOSPHATE 900 MG: 18 INJECTION, SOLUTION INTRAVENOUS at 12:08

## 2023-08-03 RX ADMIN — HYDROMORPHONE HYDROCHLORIDE 0.4 MG: 2 INJECTION INTRAMUSCULAR; INTRAVENOUS; SUBCUTANEOUS at 03:08

## 2023-08-03 RX ADMIN — SODIUM CHLORIDE, SODIUM LACTATE, POTASSIUM CHLORIDE, AND CALCIUM CHLORIDE: .6; .31; .03; .02 INJECTION, SOLUTION INTRAVENOUS at 12:08

## 2023-08-03 RX ADMIN — GENTAMICIN SULFATE 279.5 MG: 40 INJECTION, SOLUTION INTRAMUSCULAR; INTRAVENOUS at 12:08

## 2023-08-03 RX ADMIN — MIDAZOLAM HYDROCHLORIDE 2 MG: 1 INJECTION, SOLUTION INTRAMUSCULAR; INTRAVENOUS at 12:08

## 2023-08-03 RX ADMIN — SODIUM CHLORIDE, SODIUM LACTATE, POTASSIUM CHLORIDE, AND CALCIUM CHLORIDE: .6; .31; .03; .02 INJECTION, SOLUTION INTRAVENOUS at 01:08

## 2023-08-03 RX ADMIN — FENTANYL CITRATE 50 MCG: 50 INJECTION, SOLUTION INTRAMUSCULAR; INTRAVENOUS at 01:08

## 2023-08-03 RX ADMIN — ONDANSETRON 4 MG: 2 INJECTION INTRAMUSCULAR; INTRAVENOUS at 05:08

## 2023-08-03 RX ADMIN — MUPIROCIN: 20 OINTMENT TOPICAL at 11:08

## 2023-08-03 RX ADMIN — LIDOCAINE HYDROCHLORIDE 100 MG: 20 INJECTION, SOLUTION INTRAVENOUS at 12:08

## 2023-08-03 RX ADMIN — ACETAMINOPHEN 1000 MG: 500 TABLET ORAL at 11:08

## 2023-08-03 RX ADMIN — OXYCODONE HYDROCHLORIDE 5 MG: 5 TABLET ORAL at 03:08

## 2023-08-03 RX ADMIN — FENTANYL CITRATE 25 MCG: 50 INJECTION, SOLUTION INTRAMUSCULAR; INTRAVENOUS at 02:08

## 2023-08-03 RX ADMIN — GLYCOPYRROLATE 0.1 MG: 0.2 INJECTION, SOLUTION INTRAMUSCULAR; INTRAVITREAL at 12:08

## 2023-08-03 RX ADMIN — OXYCODONE HYDROCHLORIDE 5 MG: 5 TABLET ORAL at 05:08

## 2023-08-03 RX ADMIN — ONDANSETRON HYDROCHLORIDE 4 MG: 2 INJECTION INTRAMUSCULAR; INTRAVENOUS at 12:08

## 2023-08-03 RX ADMIN — FAMOTIDINE 20 MG: 20 TABLET, FILM COATED ORAL at 11:08

## 2023-08-03 RX ADMIN — PROPOFOL 160 MG: 10 INJECTION, EMULSION INTRAVENOUS at 12:08

## 2023-08-03 NOTE — TRANSFER OF CARE
"Anesthesia Transfer of Care Note    Patient: Patrica Spann    Procedure(s) Performed: Procedure(s) (LRB):  EXCISION, CYST, LABIA (Left)    Patient location: PACU    Anesthesia Type: general    Transport from OR: Transported from OR on 2-3 L/min O2 by NC with adequate spontaneous ventilation    Post pain: adequate analgesia    Post assessment: no apparent anesthetic complications and tolerated procedure well    Post vital signs: stable    Level of consciousness: responds to stimulation    Nausea/Vomiting: no nausea/vomiting    Complications: none    Transfer of care protocol was followed      Last vitals:   Visit Vitals  BP (!) 110/57 (BP Location: Right arm, Patient Position: Sitting)   Pulse 74   Temp 36.9 °C (98.5 °F) (Oral)   Resp 16   Ht 5' 4" (1.626 m)   Wt 57.6 kg (127 lb)   LMP 07/12/2023   SpO2 100%   Breastfeeding No   BMI 21.80 kg/m²     "

## 2023-08-03 NOTE — INTERVAL H&P NOTE
The patient has been examined and the H&P has been reviewed:    I concur with the findings and no changes have occurred since H&P was written.    Surgery risks, benefits and alternative options discussed and understood by patient/family.      Nayeli Finley M.D.  OB/GYN PGY- 4      Active Hospital Problems    Diagnosis  POA    *Vulvar cyst [N90.7]  Unknown      Resolved Hospital Problems   No resolved problems to display.

## 2023-08-03 NOTE — ANESTHESIA PREPROCEDURE EVALUATION
08/03/2023  Patrica Spann is a 17 y.o., female.      Pre-op Assessment    I have reviewed the Patient Summary Reports.     I have reviewed the Nursing Notes. I have reviewed the NPO Status.   I have reviewed the Medications.     Review of Systems  Anesthesia Hx:  No previous Anesthesia  Denies Family Hx of Anesthesia complications.    Social:  Non-Smoker    Hematology/Oncology:  Hematology Normal   Oncology Normal     EENT/Dental:EENT/Dental Normal   Cardiovascular:  Cardiovascular Normal     Pulmonary:  Pulmonary Normal    Renal/:  Renal/ Normal     Hepatic/GI:   GERD    Musculoskeletal:  Musculoskeletal Normal    Neurological:   Headaches    Endocrine:  Endocrine Normal    Dermatological:  Skin Normal    Psych:  Psychiatric Normal           Physical Exam  General: Well nourished, Cooperative, Alert and Oriented    Airway:  Mallampati: II   Mouth Opening: Normal  TM Distance: Normal  Tongue: Normal  Neck ROM: Normal ROM    Dental:  Intact        Anesthesia Plan  Type of Anesthesia, risks & benefits discussed:    Anesthesia Type: Gen Supraglottic Airway  Intra-op Monitoring Plan: Standard ASA Monitors  Post Op Pain Control Plan: multimodal analgesia  Induction:  IV  Airway Plan: Video, Post-Induction  Informed Consent: Informed consent signed with the Patient and all parties understand the risks and agree with anesthesia plan.  All questions answered.   ASA Score: 2  Anesthesia Plan Notes: HCT 36    Ready For Surgery From Anesthesia Perspective.     .

## 2023-08-03 NOTE — ANESTHESIA POSTPROCEDURE EVALUATION
Anesthesia Post Evaluation    Patient: Patrica Spann    Procedure(s) Performed: Procedure(s) (LRB):  EXCISION, CYST, LABIA (Left)    Final Anesthesia Type: general      Patient location during evaluation: PACU  Patient participation: Yes- Able to Participate  Level of consciousness: awake and alert  Post-procedure vital signs: reviewed and stable  Pain management: adequate  Airway patency: patent    PONV status at discharge: No PONV  Anesthetic complications: no      Cardiovascular status: blood pressure returned to baseline  Respiratory status: unassisted and spontaneous ventilation  Hydration status: euvolemic  Follow-up not needed.          Vitals Value Taken Time   /56 08/03/23 1532   Temp 36.7 °C (98.1 °F) 08/03/23 1503   Pulse 88 08/03/23 1542   Resp 16 08/03/23 1530   SpO2 100 % 08/03/23 1542   Vitals shown include unvalidated device data.      No case tracking events are documented in the log.      Pain/Anoop Score: Pain Rating Prior to Med Admin: 6 (8/3/2023  3:20 PM)  Pain Rating Post Med Admin: 5 (8/3/2023  3:31 PM)  Anoop Score: 9 (8/3/2023  3:31 PM)

## 2023-08-03 NOTE — OPERATIVE NOTE ADDENDUM
Certification of Assistant at Surgery       Surgery Date: 8/3/2023     Participating Surgeons:  Surgeon(s) and Role:     * Sienna Tinsley MD - Primary    Procedures:  Procedure(s) (LRB):  EXCISION, CYST, LABIA (Left)    Assistant Surgeon's Certification of Necessity:  I understand that section 1842 (b) (6) (d) of the Social Security Act generally prohibits Medicare Part B reasonable charge payment for the services of assistants at surgery in teaching hospitals when qualified residents are available to furnish such services. I certify that the services for which payment is claimed were medically necessary, and that no qualified resident was available to perform the services. I further understand that these services are subject to post-payment review by the Medicare carrier.      Vish Bingham PA-C    08/03/2023  3:06 PM

## 2023-08-03 NOTE — PLAN OF CARE
Patrica Spann has met all discharge criteria from Phase II. Vital Signs are stable, ambulating  without difficulty. Nausea is subsiding.  Dr Villafuerte called and will escribe an antiemetic to her [facility], in case it is needed. Discharge instructions given, patient verbalized understanding. Discharged from facility via wheelchair in stable condition.

## 2023-08-03 NOTE — ANESTHESIA PROCEDURE NOTES
Intubation    Date/Time: 8/3/2023 12:41 PM    Performed by: Felix Lynch CRNA  Authorized by: Kerwin Pelaez MD    Intubation:     Induction:  Intravenous    Intubated:  Postinduction    Mask Ventilation:  Not attempted    Attempts:  1    Attempted By:  Student    Difficult Airway Encountered?: No      Complications:  None    Airway Device:  Supraglottic airway/LMA    Airway Device Size:  3.0    Secured at:  The lips    Placement Verified By:  Capnometry    Complicating Factors:  None    Findings Post-Intubation:  BS equal bilateral

## 2023-08-03 NOTE — DISCHARGE SUMMARY
[facility]  Brief Op Note/Discharge Note  Short Stay    Admit Date: 8/3/2023    Discharge Date: 08/03/2023    Attending Physician: Sienna Alvarenga MD     Surgery Date: 8/3/2023     Surgeon(s) and Role:     * Sienna Alvarenga MD - Primary    Assisting Surgeon: None    Pre-op Diagnosis:  Vulvar cyst [N90.7]  Vulvar pain [R10.2]    Post-op Diagnosis:  Post-Op Diagnosis Codes:     * Vulvar cyst [N90.7]     * Vulvar pain [R10.2]    Procedure(s) (LRB):  EXCISION, CYST, LABIA (Left)    Anesthesia: General    Findings/Key Components: 4 cm x 2 cm left vulvar abscess    Estimated Blood Loss: 30 mL         Specimens:   Specimen (24h ago, onward)       Start     Ordered    08/03/23 1331  Specimen to Pathology, Surgery Gynecology and Obstetrics  Once        Comments: Pre-op Diagnosis: Vulvar cyst [N90.7]Vulvar pain [R10.2]Procedure(s):EXCISION, CYST, LABIA Number of specimens: 1Name of specimens: 1. VULVAR CYST WALL, LEFT     References:    Click here for ordering Quick Tip   Question Answer Comment   Procedure Type: Gynecology and Obstetrics    Which provider would you like to cc? SIENNA ALVARENGA    Release to patient Immediate        08/03/23 1416                    Discharge Provider: Nayeli Finley    Diagnoses:  Active Hospital Problems    Diagnosis  POA    *Vulvar cyst excision [N90.7]  Unknown      Resolved Hospital Problems   No resolved problems to display.       Discharged Condition: good    Hospital Course:   Patient was admitted for outpatient procedure as above, and tolerated the procedure well with no complications. Please see operative report for further details. Following the procedure, the patient was awakened from anesthesia and transferred to the recovery area in stable condition. She was discharged to home once ambulating, voiding, tolerating PO intake, and pain was well-controlled. Patient was given routine post-op instructions and prescriptions for pain medication to take as needed. Patient  instructed to follow up with Dr. Tinsley in 6 weeks.    Final Diagnoses: Same as principal problem.    Disposition: Home or Self Care    Follow up/Patient Instructions:    Medications:  Reconciled Home Medications:      Medication List        START taking these medications      docusate sodium 100 MG capsule  Commonly known as: COLACE  Take 1 capsule (100 mg total) by mouth 2 (two) times daily as needed for Constipation.     ibuprofen 600 MG tablet  Commonly known as: ADVIL,MOTRIN  Take 1 tablet (600 mg total) by mouth every 6 (six) hours as needed for Pain.     oxyCODONE 5 MG immediate release tablet  Commonly known as: ROXICODONE  Take 1 tablet (5 mg total) by mouth every 4 (four) hours as needed for Pain.            CONTINUE taking these medications      norethindrone-ethinyl estradiol 1-20 mg-mcg per tablet  Commonly known as: MICROGESTIN 1/20  Take 1 tablet by mouth once daily.     pantoprazole 20 MG tablet  Commonly known as: PROTONIX  Take 1 tablet (20 mg total) by mouth once daily.     PENICILLIN V POTASSIUM ORAL  Take 500 mg by mouth 2 (two) times a day.            Discharge Procedure Orders   Diet Adult Regular     No driving until:   Order Comments: Until not taking narcotic pain medication.     Pelvic Rest   Order Comments: Pelvic rest for at least 2 weeks     Notify your health care provider if you experience any of the following:  temperature >100.4     Notify your health care provider if you experience any of the following:  persistent nausea and vomiting or diarrhea     Notify your health care provider if you experience any of the following:  severe uncontrolled pain     Notify your health care provider if you experience any of the following:  redness, tenderness, or signs of infection (pain, swelling, redness, odor or green/yellow discharge around incision site)     Notify your health care provider if you experience any of the following:  difficulty breathing or increased cough     Notify your health  care provider if you experience any of the following:  severe persistent headache     Notify your health care provider if you experience any of the following:  worsening rash     Notify your health care provider if you experience any of the following:  persistent dizziness, light-headedness, or visual disturbances     Notify your health care provider if you experience any of the following:  increased confusion or weakness     Notify your health care provider if you experience any of the following:   Order Comments: Heavy vaginal bleeding saturating more than 1 pad per hour for at least 2 hours.     Ice to affected area     Activity as tolerated      Follow-up Information       Sienna Tinsley MD Follow up in 4 week(s).    Specialties: Obstetrics and Gynecology, UroGynecology   Why: Post op visit  Contact information:  [address] [id number] [phone]                           Nayeli Finley M.D.  OB/GYN PGY- 4

## 2023-08-03 NOTE — OP NOTE
[facility]  Urogynecology  Operative Note    SUMMARY     Date of Procedure: 8/3/2023     Procedure: Procedure(s) (LRB):  EXCISION, CYST, LABIA (Left)       Surgeon(s) and Role:     * Sienna Tinsley MD - Primary    Assisting Surgeon: Nayeli Finley, PGY 4    Pre-Operative Diagnosis: Vulvar cyst [N90.7]  Vulvar pain [R10.2]    Post-Operative Diagnosis: Post-Op Diagnosis Codes:     * Vulvar cyst [N90.7]     * Vulvar pain [R10.2]    Anesthesia: General    Operative Findings (including complications, if any): 4 cm x 2 cm left vulvar abscess    Description of Technical Procedures:   Patient was taken to the operating room, where anesthesia was administered and found to be adequate. She was placed in the dorsal lithotomy position using yellow fin stirrups. Care was taken to avoid joint hyperflexion or hyperextension, and all extremity surfaces were carefully padded so as to minimize the risk of neurologic injury. Sequential compression devices were applied to the patient's lower extremities preoperatively.  A surgical time-out was performed where the patient was identified and procedures confirmed.      An exam under anesthesia was performed and revealed the above findings. The patient's perineum and vagina were sterilely prepped and draped.The bladder was drained prior to the onset of the procedure. Using 0/25% marcaine the left vulvar region in the location of the cyst was injected. Using a scalpel a vertical incision was made between the left labial minora and majora at the location of her scar from prior incision and drainage procedures. The subcutaneous tissue was grasped and the tissue undermined to expose the cyst wall until the base was exposed The cyst wall was then grasped and using right angle clamps the to ensure hemostasis the cyst was excised and sent to pathology for evaluation. The clamped base was suture ligated with 2-0 Vicryl. Floseal was injected at he base to help with hemostasis  along with electrocautery.The underlying subcutaneous tissue was re-approximated with interrupted sutures of 2-0 Vicryl. The epithelium was re-approximated with both 3-0 Monocryl.  Hemostasis noted afterwards. Additional 0.25% Marcaine was injected around the surgical site.   Patient tolerated the procedure well. She was taken to the recovery room in stable condition. Laps, instrument needle counts were correct. Dr. Alvarenga, who was the attending was present and participated throughout the procedure.       Estimated Blood Loss (EBL): 50 mL           Implants: * No implants in log *    Specimens:   Specimen (24h ago, onward)       Start     Ordered    08/03/23 1331  Specimen to Pathology, Surgery Gynecology and Obstetrics  Once        Comments: Pre-op Diagnosis: Vulvar cyst [N90.7]Vulvar pain [R10.2]Procedure(s):EXCISION, CYST, LABIA Number of specimens: 1Name of specimens: 1. VULVAR CYST WALL, LEFT     References:    Click here for ordering Quick Tip   Question Answer Comment   Procedure Type: Gynecology and Obstetrics    Which provider would you like to cc? SIENNA ALVARENGA.    Release to patient Immediate        08/03/23 0638                            Condition: Good    Disposition: PACU - hemodynamically stable.    Attestation: I was present and scrubbed for the entire procedure.    Sienna Alvarenga MD

## 2023-08-04 ENCOUNTER — TELEPHONE (OUTPATIENT)
Dept: UROGYNECOLOGY | Facility: CLINIC | Age: 18
End: 2023-08-04
Payer: MEDICAID

## 2023-08-04 ENCOUNTER — PATIENT MESSAGE (OUTPATIENT)
Dept: UROGYNECOLOGY | Facility: CLINIC | Age: 18
End: 2023-08-04
Payer: MEDICAID

## 2023-08-07 LAB — BACTERIA SPEC AEROBE CULT: NO GROWTH

## 2023-08-09 LAB
FINAL PATHOLOGIC DIAGNOSIS: NORMAL
Lab: NORMAL

## 2023-08-11 LAB — BACTERIA SPEC ANAEROBE CULT: ABNORMAL

## 2023-08-13 ENCOUNTER — PATIENT MESSAGE (OUTPATIENT)
Dept: UROGYNECOLOGY | Facility: CLINIC | Age: 18
End: 2023-08-13
Payer: MEDICAID

## 2023-08-15 ENCOUNTER — TELEPHONE (OUTPATIENT)
Dept: UROGYNECOLOGY | Facility: CLINIC | Age: 18
End: 2023-08-15
Payer: MEDICAID

## 2023-08-17 ENCOUNTER — PATIENT MESSAGE (OUTPATIENT)
Dept: UROGYNECOLOGY | Facility: CLINIC | Age: 18
End: 2023-08-17
Payer: MEDICAID

## 2023-08-18 ENCOUNTER — OFFICE VISIT (OUTPATIENT)
Dept: UROGYNECOLOGY | Facility: CLINIC | Age: 18
End: 2023-08-18
Payer: MEDICAID

## 2023-08-18 VITALS
DIASTOLIC BLOOD PRESSURE: 59 MMHG | WEIGHT: 127 LBS | SYSTOLIC BLOOD PRESSURE: 132 MMHG | HEART RATE: 95 BPM | BODY MASS INDEX: 21.8 KG/M2

## 2023-08-18 DIAGNOSIS — N89.8 VAGINAL DISCHARGE: Primary | ICD-10-CM

## 2023-08-18 PROCEDURE — 3078F DIAST BP <80 MM HG: CPT | Mod: CPTII,,, | Performed by: OBSTETRICS & GYNECOLOGY

## 2023-08-18 PROCEDURE — 81514 NFCT DS BV&VAGINITIS DNA ALG: CPT | Performed by: OBSTETRICS & GYNECOLOGY

## 2023-08-18 PROCEDURE — 99024 POSTOP FOLLOW-UP VISIT: CPT | Mod: ,,, | Performed by: OBSTETRICS & GYNECOLOGY

## 2023-08-18 PROCEDURE — 99024 PR POST-OP FOLLOW-UP VISIT: ICD-10-PCS | Mod: ,,, | Performed by: OBSTETRICS & GYNECOLOGY

## 2023-08-18 PROCEDURE — 3008F BODY MASS INDEX DOCD: CPT | Mod: CPTII,,, | Performed by: OBSTETRICS & GYNECOLOGY

## 2023-08-18 PROCEDURE — 3008F PR BODY MASS INDEX (BMI) DOCUMENTED: ICD-10-PCS | Mod: CPTII,,, | Performed by: OBSTETRICS & GYNECOLOGY

## 2023-08-18 PROCEDURE — 99999 PR PBB SHADOW E&M-EST. PATIENT-LVL II: CPT | Mod: PBBFAC,,, | Performed by: OBSTETRICS & GYNECOLOGY

## 2023-08-18 PROCEDURE — 3075F PR MOST RECENT SYSTOLIC BLOOD PRESS GE 130-139MM HG: ICD-10-PCS | Mod: CPTII,,, | Performed by: OBSTETRICS & GYNECOLOGY

## 2023-08-18 PROCEDURE — 99999 PR PBB SHADOW E&M-EST. PATIENT-LVL II: ICD-10-PCS | Mod: PBBFAC,,, | Performed by: OBSTETRICS & GYNECOLOGY

## 2023-08-18 PROCEDURE — 3075F SYST BP GE 130 - 139MM HG: CPT | Mod: CPTII,,, | Performed by: OBSTETRICS & GYNECOLOGY

## 2023-08-18 PROCEDURE — 99212 OFFICE O/P EST SF 10 MIN: CPT | Mod: PBBFAC | Performed by: OBSTETRICS & GYNECOLOGY

## 2023-08-18 PROCEDURE — 3078F PR MOST RECENT DIASTOLIC BLOOD PRESSURE < 80 MM HG: ICD-10-PCS | Mod: CPTII,,, | Performed by: OBSTETRICS & GYNECOLOGY

## 2023-08-18 RX ORDER — FLUCONAZOLE 200 MG/1
200 TABLET ORAL DAILY
Qty: 1 TABLET | Refills: 0 | Status: SHIPPED | OUTPATIENT
Start: 2023-08-18 | End: 2023-08-19

## 2023-08-18 RX ORDER — SULFAMETHOXAZOLE AND TRIMETHOPRIM 800; 160 MG/1; MG/1
1 TABLET ORAL 2 TIMES DAILY
Qty: 10 TABLET | Refills: 0 | Status: SHIPPED | OUTPATIENT
Start: 2023-08-18 | End: 2023-08-23

## 2023-08-18 NOTE — PROGRESS NOTES
CC: Post-operative visit  S/p left vulvar cyst excision on 8/3/23    HPI:  Patient is a 17 y.o. female  presents today for a postoperative visit. She continues to have some mild yellow pink discharge. Denies any pain unless she is in certain positions. She repots needing to use a tissue to keep the area clean and dry. Reports that the drainage has not gotten any less.     REVIEW OF SYSTEMS:  Per HPI. All other reviewed systems are negative.        PHYSICAL EXAMINATION  BP (!) 132/59   Pulse 95   Wt 57.6 kg (127 lb)   LMP 2023   BMI 21.80 kg/m²     General: Healthy in appearance, Well nourished, Affect Normal, NAD.  Ext: No clubbing, cyanosis, edema or varicosities.      Genitourinary-  Vulva: left vulvar area with pink scar tissue/ granulation tissue, non-tender, small 2 mm defect noted on the labia minora on the inside.  Urethra: normal in appearance and normal skene's glands  Vagina: introitus with yellow discharge noted     ASSESSMENT & PLAN:    Vaginal discharge  -     VAGINOSIS SCREEN BY DNA PROBE  -     sulfamethoxazole-trimethoprim 800-160mg (BACTRIM DS) 800-160 mg Tab; Take 1 tablet by mouth 2 (two) times daily. for 5 days  Dispense: 10 tablet; Refill: 0  -     fluconazole (DIFLUCAN) 200 MG Tab; Take 1 tablet (200 mg total) by mouth once daily. for 1 day  Dispense: 1 tablet; Refill: 0     18 yo s/p a vulvar epidermal inclusion cyst excision on 8/3/23 presented for a postoperative visit. A small amount of yellow discharge is noted and a vaginitis swab was obtained. Will treat with bactrim and fluconazole to see if this will help. One culture shows cutibacterium acnes. Discussed that there is some granulation tissue present and this can be revised if needed depending on how it looks at her 6 week visit.       All questions were answered today. The patient was encouraged to contact the office as needed with any additional questions or concerns.       Sienna Tinsley MD

## 2023-08-19 LAB
BACTERIAL VAGINOSIS DNA: POSITIVE
CANDIDA GLABRATA DNA: NEGATIVE
CANDIDA KRUSEI DNA: NEGATIVE
CANDIDA RRNA VAG QL PROBE: NEGATIVE
T VAGINALIS RRNA GENITAL QL PROBE: NEGATIVE

## 2023-08-21 ENCOUNTER — PATIENT MESSAGE (OUTPATIENT)
Dept: UROGYNECOLOGY | Facility: CLINIC | Age: 18
End: 2023-08-21
Payer: MEDICAID

## 2023-08-21 DIAGNOSIS — B96.89 BACTERIAL VAGINOSIS: Primary | ICD-10-CM

## 2023-08-21 DIAGNOSIS — N76.0 BACTERIAL VAGINOSIS: Primary | ICD-10-CM

## 2023-08-21 RX ORDER — METRONIDAZOLE 500 MG/1
500 TABLET ORAL EVERY 12 HOURS
Qty: 14 TABLET | Refills: 0 | Status: SHIPPED | OUTPATIENT
Start: 2023-08-21 | End: 2023-08-28

## 2023-09-04 LAB — FUNGUS SPEC CULT: NORMAL

## 2023-09-15 ENCOUNTER — OFFICE VISIT (OUTPATIENT)
Dept: UROGYNECOLOGY | Facility: CLINIC | Age: 18
End: 2023-09-15
Payer: MEDICAID

## 2023-09-15 VITALS
HEART RATE: 97 BPM | WEIGHT: 132 LBS | DIASTOLIC BLOOD PRESSURE: 59 MMHG | BODY MASS INDEX: 22.66 KG/M2 | SYSTOLIC BLOOD PRESSURE: 120 MMHG

## 2023-09-15 DIAGNOSIS — N90.7 VULVAR CYST: Primary | ICD-10-CM

## 2023-09-15 PROCEDURE — 3074F SYST BP LT 130 MM HG: CPT | Mod: CPTII,,, | Performed by: OBSTETRICS & GYNECOLOGY

## 2023-09-15 PROCEDURE — 99999 PR PBB SHADOW E&M-EST. PATIENT-LVL II: CPT | Mod: PBBFAC,,, | Performed by: OBSTETRICS & GYNECOLOGY

## 2023-09-15 PROCEDURE — 3074F PR MOST RECENT SYSTOLIC BLOOD PRESSURE < 130 MM HG: ICD-10-PCS | Mod: CPTII,,, | Performed by: OBSTETRICS & GYNECOLOGY

## 2023-09-15 PROCEDURE — 99212 OFFICE O/P EST SF 10 MIN: CPT | Mod: PBBFAC | Performed by: OBSTETRICS & GYNECOLOGY

## 2023-09-15 PROCEDURE — 99999 PR PBB SHADOW E&M-EST. PATIENT-LVL II: ICD-10-PCS | Mod: PBBFAC,,, | Performed by: OBSTETRICS & GYNECOLOGY

## 2023-09-15 PROCEDURE — 3008F BODY MASS INDEX DOCD: CPT | Mod: CPTII,,, | Performed by: OBSTETRICS & GYNECOLOGY

## 2023-09-15 PROCEDURE — 3008F PR BODY MASS INDEX (BMI) DOCUMENTED: ICD-10-PCS | Mod: CPTII,,, | Performed by: OBSTETRICS & GYNECOLOGY

## 2023-09-15 PROCEDURE — 3078F PR MOST RECENT DIASTOLIC BLOOD PRESSURE < 80 MM HG: ICD-10-PCS | Mod: CPTII,,, | Performed by: OBSTETRICS & GYNECOLOGY

## 2023-09-15 PROCEDURE — 3078F DIAST BP <80 MM HG: CPT | Mod: CPTII,,, | Performed by: OBSTETRICS & GYNECOLOGY

## 2023-09-15 PROCEDURE — 99024 POSTOP FOLLOW-UP VISIT: CPT | Mod: ,,, | Performed by: OBSTETRICS & GYNECOLOGY

## 2023-09-15 PROCEDURE — 99024 PR POST-OP FOLLOW-UP VISIT: ICD-10-PCS | Mod: ,,, | Performed by: OBSTETRICS & GYNECOLOGY

## 2023-09-15 NOTE — PROGRESS NOTES
CC: Post-operative visit  S/p left vulvar cyst excision on 8/3/23    HPI:  Patient is a 17 y.o. female  presents today for a postoperative visit. States that she is doing well. Denies any pain, discharge or odor. Denies any drainage. Reports that she has not attempted to be sexually active. States that the pink scar tissue had gotten less as well and is barely noticeable. .     REVIEW OF SYSTEMS:  Per HPI. All other reviewed systems are negative.        PHYSICAL EXAMINATION  BP (!) 120/59   Pulse 97   Wt 59.9 kg (132 lb)   BMI 22.66 kg/m²     General: Healthy in appearance, Well nourished, Affect Normal, NAD.  Ext: No clubbing, cyanosis, edema or varicosities.      Genitourinary-  Vulva: left vulvar area with minimal pink scar tissue, no defect not of the labia minora on the inside.  Urethra: normal in appearance and normal skene's glands  Vagina: no discharge or lesions  Cervix: normal in appearance, no lesions or discharge.    ASSESSMENT & PLAN:    Vulvar cyst excision      16 yo s/p a vulvar epidermal inclusion cyst excision on 8/3/23 presented for a postoperative visit. Tissues are well healed with minimal pink scar remaining. Reviewed that she may return to all normal activities. Discussed risk or recurrence or new cyst with waxing and shaving. Patient to return as needed.     All questions were answered today. The patient was encouraged to contact the office as needed with any additional questions or concerns.       Sienna Tinsley MD

## 2023-09-22 LAB
ACID FAST MOD KINY STN SPEC: NORMAL
MYCOBACTERIUM SPEC QL CULT: NORMAL

## 2023-10-20 ENCOUNTER — TELEPHONE (OUTPATIENT)
Dept: OBSTETRICS AND GYNECOLOGY | Facility: CLINIC | Age: 18
End: 2023-10-20
Payer: MEDICAID

## 2023-10-23 ENCOUNTER — TELEPHONE (OUTPATIENT)
Dept: OBSTETRICS AND GYNECOLOGY | Facility: CLINIC | Age: 18
End: 2023-10-23
Payer: MEDICAID

## 2024-12-10 ENCOUNTER — PATIENT MESSAGE (OUTPATIENT)
Dept: ADMINISTRATIVE | Facility: HOSPITAL | Age: 19
End: 2024-12-10
Payer: MEDICAID

## 2025-03-25 ENCOUNTER — TELEPHONE (OUTPATIENT)
Dept: PRIMARY CARE CLINIC | Facility: CLINIC | Age: 20
End: 2025-03-25
Payer: MEDICAID

## 2025-03-31 ENCOUNTER — PATIENT OUTREACH (OUTPATIENT)
Dept: ADMINISTRATIVE | Facility: HOSPITAL | Age: 20
End: 2025-03-31
Payer: MEDICAID

## 2025-05-19 NOTE — LETTER
December 11, 2020    Patrica Spann  [address] [place] [address]             [facility]  Pediatric Gastroenterology  [address]  [facility] [address]  Phone: [phone]   December 11, 2020     Patient: Patrica Spann   YOB: 2005   Date of Visit: 12/11/2020       To Whom it May Concern:    Patrica Spann was seen in my clinic on 12/11/2020. She may return to school on 12/14/2020.    Please excuse her from any classes or work missed.    If you have any questions or concerns, please don't hesitate to call.    Sincerely,         Lindsay Taylor MD      Have you been to the ER, urgent care clinic since your last visit?  Hospitalized since your last visit?   NO    Have you seen or consulted any other health care providers outside our system since your last visit?   NO            This chart was generated using Dragon dictation software. Although every effort was made to ensure the accuracy of this automated transcription, some errors in transcription may have occurred.

## 2025-05-21 ENCOUNTER — PATIENT MESSAGE (OUTPATIENT)
Dept: UROGYNECOLOGY | Facility: CLINIC | Age: 20
End: 2025-05-21
Payer: MEDICAID

## (undated) DEVICE — SPONGE KITTNER 1/4X 5/8 L STRL

## (undated) DEVICE — SOL POVIDONE SCRUB IODINE 4 OZ

## (undated) DEVICE — GLOVE SENSICARE PI GRN 6.5

## (undated) DEVICE — SEE MEDLINE ITEM 152622

## (undated) DEVICE — SOL IRR SOD CHL .9% POUR

## (undated) DEVICE — SUT VICRYL CTD 2-0 GI 27 SH

## (undated) DEVICE — SPONGE GAUZE 16PLY 4X4

## (undated) DEVICE — ELECTRODE NEEDLE 1IN

## (undated) DEVICE — GLOVE SENSICARE PI SURG 6.5

## (undated) DEVICE — PACK PERI/GYN OPTIMA

## (undated) DEVICE — SUT MCRYL PLUS 4-0 PS2 27IN

## (undated) DEVICE — ELECTRODE NEEDLE 2.8IN

## (undated) DEVICE — LUBRICANT SURGILUBE 2 OZ

## (undated) DEVICE — SEE MEDLINE ITEM 154981

## (undated) DEVICE — SYR 10CC LUER LOCK

## (undated) DEVICE — NDL HYPO A BEVEL 22X1 1/2

## (undated) DEVICE — BLADE SURG CARBON STEEL SZ11

## (undated) DEVICE — KIT SURGIFLO HEMOSTATIC MATRIX

## (undated) DEVICE — COVER LIGHT HANDLE 80/CA

## (undated) DEVICE — TRAY DRY SKIN SCRUB PREP

## (undated) DEVICE — Device

## (undated) DEVICE — CLIPPER BLADE MOD 4406 (CAREF)

## (undated) DEVICE — SUT CTD VICRYL BR CR/SH VIL

## (undated) DEVICE — SUT MCRYL PLUS 3-0 PS2 27IN

## (undated) DEVICE — TRAY MINOR GEN SURG

## (undated) DEVICE — CATH URETHRAL 16FR RED

## (undated) DEVICE — SOL POVIDONE PREP IODINE 4 OZ

## (undated) DEVICE — SUT CTD VICRYL CT-1 27

## (undated) DEVICE — ELECTRODE REM PLYHSV RETURN 9

## (undated) DEVICE — GOWN SURGICAL X-LARGE